# Patient Record
Sex: FEMALE | Race: WHITE | HISPANIC OR LATINO | Employment: FULL TIME | URBAN - NONMETROPOLITAN AREA
[De-identification: names, ages, dates, MRNs, and addresses within clinical notes are randomized per-mention and may not be internally consistent; named-entity substitution may affect disease eponyms.]

---

## 2017-03-21 ENCOUNTER — HOSPITAL ENCOUNTER (OUTPATIENT)
Dept: LAB | Facility: MEDICAL CENTER | Age: 57
End: 2017-03-21
Attending: INTERNAL MEDICINE
Payer: COMMERCIAL

## 2017-03-21 DIAGNOSIS — Z00.00 PREVENTATIVE HEALTH CARE: ICD-10-CM

## 2017-03-21 LAB
25(OH)D3 SERPL-MCNC: 48 NG/ML (ref 30–100)
ALBUMIN SERPL BCP-MCNC: 4.3 G/DL (ref 3.2–4.9)
ALBUMIN/GLOB SERPL: 1.6 G/DL
ALP SERPL-CCNC: 70 U/L (ref 30–99)
ALT SERPL-CCNC: 20 U/L (ref 2–50)
ANION GAP SERPL CALC-SCNC: 9 MMOL/L (ref 0–11.9)
AST SERPL-CCNC: 19 U/L (ref 12–45)
BASOPHILS # BLD AUTO: 0.05 K/UL (ref 0–0.12)
BASOPHILS NFR BLD AUTO: 0.8 % (ref 0–1.8)
BILIRUB SERPL-MCNC: 0.6 MG/DL (ref 0.1–1.5)
BUN SERPL-MCNC: 17 MG/DL (ref 8–22)
CALCIUM SERPL-MCNC: 10 MG/DL (ref 8.5–10.5)
CHLORIDE SERPL-SCNC: 106 MMOL/L (ref 96–112)
CHOLEST SERPL-MCNC: 158 MG/DL (ref 100–199)
CO2 SERPL-SCNC: 28 MMOL/L (ref 20–33)
CREAT SERPL-MCNC: 0.8 MG/DL (ref 0.5–1.4)
EOSINOPHIL # BLD: 0.23 K/UL (ref 0–0.51)
EOSINOPHIL NFR BLD AUTO: 3.7 % (ref 0–6.9)
ERYTHROCYTE [DISTWIDTH] IN BLOOD BY AUTOMATED COUNT: 39.9 FL (ref 35.9–50)
GLOBULIN SER CALC-MCNC: 2.7 G/DL (ref 1.9–3.5)
GLUCOSE SERPL-MCNC: 100 MG/DL (ref 65–99)
HCT VFR BLD AUTO: 41.7 % (ref 37–47)
HDLC SERPL-MCNC: 39 MG/DL
HGB BLD-MCNC: 14 G/DL (ref 12–16)
IMM GRANULOCYTES # BLD AUTO: 0.01 K/UL (ref 0–0.11)
IMM GRANULOCYTES NFR BLD AUTO: 0.2 % (ref 0–0.9)
LDLC SERPL CALC-MCNC: 96 MG/DL
LYMPHOCYTES # BLD: 1.9 K/UL (ref 1–4.8)
LYMPHOCYTES NFR BLD AUTO: 30.4 % (ref 22–41)
MCH RBC QN AUTO: 29.4 PG (ref 27–33)
MCHC RBC AUTO-ENTMCNC: 33.6 G/DL (ref 33.6–35)
MCV RBC AUTO: 87.4 FL (ref 81.4–97.8)
MONOCYTES # BLD: 0.29 K/UL (ref 0–0.85)
MONOCYTES NFR BLD AUTO: 4.6 % (ref 0–13.4)
NEUTROPHILS # BLD: 3.78 K/UL (ref 2–7.15)
NEUTROPHILS NFR BLD AUTO: 60.3 % (ref 44–72)
NRBC # BLD AUTO: 0 K/UL
NRBC BLD-RTO: 0 /100 WBC
PLATELET # BLD AUTO: 267 K/UL (ref 164–446)
PMV BLD AUTO: 10.1 FL (ref 9–12.9)
POTASSIUM SERPL-SCNC: 3.7 MMOL/L (ref 3.6–5.5)
PROT SERPL-MCNC: 7 G/DL (ref 6–8.2)
RBC # BLD AUTO: 4.77 M/UL (ref 4.2–5.4)
SODIUM SERPL-SCNC: 143 MMOL/L (ref 135–145)
TRIGL SERPL-MCNC: 115 MG/DL (ref 0–149)
WBC # BLD AUTO: 6.3 K/UL (ref 4.8–10.8)

## 2017-03-21 PROCEDURE — 36415 COLL VENOUS BLD VENIPUNCTURE: CPT

## 2017-03-21 PROCEDURE — 80061 LIPID PANEL: CPT

## 2017-03-21 PROCEDURE — 85025 COMPLETE CBC W/AUTO DIFF WBC: CPT

## 2017-03-21 PROCEDURE — 80053 COMPREHEN METABOLIC PANEL: CPT

## 2017-03-21 PROCEDURE — 82306 VITAMIN D 25 HYDROXY: CPT

## 2017-03-30 ENCOUNTER — OFFICE VISIT (OUTPATIENT)
Dept: MEDICAL GROUP | Facility: PHYSICIAN GROUP | Age: 57
End: 2017-03-30
Payer: COMMERCIAL

## 2017-03-30 VITALS
SYSTOLIC BLOOD PRESSURE: 116 MMHG | WEIGHT: 181 LBS | HEART RATE: 68 BPM | DIASTOLIC BLOOD PRESSURE: 84 MMHG | OXYGEN SATURATION: 95 % | HEIGHT: 62 IN | BODY MASS INDEX: 33.31 KG/M2 | RESPIRATION RATE: 16 BRPM | TEMPERATURE: 97.8 F

## 2017-03-30 DIAGNOSIS — E03.9 HYPOTHYROIDISM, UNSPECIFIED TYPE: ICD-10-CM

## 2017-03-30 DIAGNOSIS — I10 HTN, GOAL BELOW 130/80: Chronic | ICD-10-CM

## 2017-03-30 DIAGNOSIS — R73.01 FASTING HYPERGLYCEMIA: ICD-10-CM

## 2017-03-30 DIAGNOSIS — F41.9 ANXIETY: ICD-10-CM

## 2017-03-30 DIAGNOSIS — E55.9 VITAMIN D DEFICIENCY: ICD-10-CM

## 2017-03-30 DIAGNOSIS — Z86.69 HISTORY OF SCIATICA: ICD-10-CM

## 2017-03-30 DIAGNOSIS — E66.9 NON MORBID OBESITY, UNSPECIFIED OBESITY TYPE: ICD-10-CM

## 2017-03-30 DIAGNOSIS — E78.00 HYPERCHOLESTEROLEMIA: Chronic | ICD-10-CM

## 2017-03-30 PROCEDURE — 99214 OFFICE O/P EST MOD 30 MIN: CPT | Performed by: INTERNAL MEDICINE

## 2017-03-30 ASSESSMENT — PAIN SCALES - GENERAL: PAINLEVEL: 3=SLIGHT PAIN

## 2017-03-30 NOTE — PATIENT INSTRUCTIONS
Goal bp 135/85 or below.      Trial stop the hydrochlorothiazide and potassium.  Blood pressure monitoring at various times during the day.    Gradual ongoing stretching strengthening exercise

## 2017-03-30 NOTE — MR AVS SNAPSHOT
"        Carey Gutierrez   3/30/2017 11:00 AM   Office Visit   MRN: [id number]    Department:  [facility]   Dept Phone:  [phone]    Description:  Female : 1960   Provider:  Kierra MAGALLANES M.D.           Reason for Visit     Results labs       Allergies as of 3/30/2017     Allergen Noted Reactions    Erythromycin 2016   Nausea    Lisinopril 2017   Cough    Nkda [No Known Drug Allergy] 2010         You were diagnosed with     Non morbid obesity, unspecified obesity type   [1602467]       Anxiety   [096148]       HTN, goal below 130/80   [766926]       History of sciatica   [283753]       Hypercholesterolemia   [001370]       Vitamin D deficiency   [5928758]       Fasting hyperglycemia   [540622]       Hypothyroidism, unspecified type   [9423572]         Vital Signs     Blood Pressure Pulse Temperature Respirations Height Weight    116/84 mmHg 68 36.6 °C (97.8 °F) 16 1.575 m (5' 2\") 82.101 kg (181 lb)    Body Mass Index Oxygen Saturation Last Menstrual Period Smoking Status          33.10 kg/m2 95% 2010 Former Smoker        Basic Information     Date Of Birth Sex Race Ethnicity Preferred Language    1960 Female White Non- English      Your appointments     2017  1:00 PM   Adult Draw/Collection with [facility]   [facility] (--)    [address]  [place] [address]   [phone]            2017 10:40 AM   Established Patient with Kierra MAGALLANES M.D.   [facility] (--)    [address]  [place] [id number] [phone]           You will be receiving a confirmation call a few days before your appointment from our automated call confirmation system.              Problem List              ICD-10-CM Priority Class Noted - Resolved    MDD (major depressive disorder) F32.9   2013 - Present    Anxiety F41.9   2013 - Present    HTN, goal below 130/80 (Chronic) I10   2013 - Present   " Hypercholesterolemia (Chronic) E78.00   2/5/2013 - Present    SNORING DISORDER (Chronic)    2/20/2013 - Present    Obesity E66.9   9/30/2013 - Present    Eczema, dyshidrotic L30.1   11/12/2013 - Present    Vitamin D deficiency E55.9   12/31/2015 - Present    Fatigue R53.83   12/31/2015 - Present    History of sciatica Z86.69   3/30/2017 - Present      Health Maintenance        Date Due Completion Dates    IMM DTaP/Tdap/Td Vaccine (1 - Tdap) 12/22/1980 ---    IMM INFLUENZA (1) 9/1/2016 11/29/2011, 3/10/2010    MAMMOGRAM 3/22/2018 3/22/2017    COLONOSCOPY 12/28/2021 12/28/2011 (Done)    Override on 12/28/2011: Done            Current Immunizations     Influenza TIV (IM) 11/29/2011, 3/10/2010 12:27 PM    Pneumococcal polysaccharide vaccine (PPSV-23) 3/10/2010 12:15 PM      Below and/or attached are the medications your provider expects you to take. Review all of your home medications and newly ordered medications with your provider and/or pharmacist. Follow medication instructions as directed by your provider and/or pharmacist. Please keep your medication list with you and share with your provider. Update the information when medications are discontinued, doses are changed, or new medications (including over-the-counter products) are added; and carry medication information at all times in the event of emergency situations     Allergies:  ERYTHROMYCIN - Nausea     LISINOPRIL - Cough     NKDA - (reactions not documented)               Medications  Valid as of: March 30, 2017 - 11:39 AM    Generic Name Brand Name Tablet Size Instructions for use    Ergocalciferol (Cap) DRISDOL 88719 UNIT Take 1 capsule 3x week.        FLUoxetine HCl (Cap) PROZAC 20 MG Take 1 Cap by mouth every day.        HydroCHLOROthiazide (Tab) HYDRODIURIL 25 MG Take 1 Tab by mouth every day.        Potassium Chloride Macey CR (Tab CR) Kdur 20 MEQ Take 1 Tab by mouth every day.        Simvastatin (Tab) ZOCOR 10 MG Take 1 Tab by mouth every evening.          Terbinafine HCl (Tab) LAMISIL 250 MG Take 250 mg by mouth every day.        .                 Medicines prescribed today were sent to:     [address] - [facility] [address] [place] [address]    Phone: [phone] Fax: [phone]    Open 24 Hours?: No      Medication refill instructions:       If your prescription bottle indicates you have medication refills left, it is not necessary to call your provider’s office. Please contact your pharmacy and they will refill your medication.    If your prescription bottle indicates you do not have any refills left, you may request refills at any time through one of the following ways: The online [employer] system (except Urgent Care), by calling your provider’s office, or by asking your pharmacy to contact your provider’s office with a refill request. Medication refills are processed only during regular business hours and may not be available until the next business day. Your provider may request additional information or to have a follow-up visit with you prior to refilling your medication.   *Please Note: Medication refills are assigned a new Rx number when refilled electronically. Your pharmacy may indicate that no refills were authorized even though a new prescription for the same medication is available at the pharmacy. Please request the medicine by name with the pharmacy before contacting your provider for a refill.        Your To Do List     Future Labs/Procedures Complete By Expires    BASIC METABOLIC PANEL  As directed 3/30/2018    TSH  As directed 3/30/2018      Instructions      Goal bp 135/85 or below.      Trial stop the hydrochlorothiazide and potassium.  Blood pressure monitoring at various times during the day.    Gradual ongoing stretching strengthening exercise          Boosted Boardshart Access Code: Activation code not generated  Current [employer]ent Status: Active

## 2017-03-30 NOTE — ASSESSMENT & PLAN NOTE
Patient states her anxiety is good and she is continuing on the Prozac. Patient reports she has been on meds for many years and multiple family members with anxiety.

## 2017-03-30 NOTE — ASSESSMENT & PLAN NOTE
Patient not following at home except rarely and no complaints of elevation, no chest pain or palpitations, she is continuing the HCTZ.  Had cough in the past with the lisinopril

## 2017-03-30 NOTE — ASSESSMENT & PLAN NOTE
Patient with bilateral lateral knee pain, reports she has history of sciatica and that has improved, now with pain only lateral knees bilateraly.  No weakness. She is doing knee and back exercises.

## 2017-03-30 NOTE — ASSESSMENT & PLAN NOTE
Patient is working on diet, has , doing yoga.  Working on some weight watchers, weight is down about 10#.  states she craves sugar.

## 2017-04-03 NOTE — PROGRESS NOTES
Chief Complaint   Patient presents with   • Results     labs        HISTORY OF PRESENT ILLNESS: Patient is a 56 y.o. female established patient who presents today to discuss the medical issues below.    Obesity  Patient is working on diet, has , doing yoga.  Working on some weight watchers, weight is down about 10#.  states she craves sugar.      Anxiety  Patient states her anxiety is controlled and she is continuing on the Prozac. Patient reports she has been on meds for many years and multiple family members with anxiety. Sleeping well, food difficult to control.     HTN, goal below 130/80  Patient not following at home except rarely and no complaints of elevation, no chest pain or palpitations, she is continuing the HCTZ.  Had cough in the past with the lisinopril    History of sciatica  Patient with bilateral lateral knee pain, reports she has history of sciatica and that has improved, now with pain only lateral knees bilateraly.  No weakness. She is doing knee and back exercises.     Hypercholesterolemia  continueson statin, working on exercise, had labs.      Vitamin D deficiency  Patient on vit d supplement 50,000 IU weekly, had labs.        Patient Active Problem List    Diagnosis Date Noted   • History of sciatica 03/30/2017   • Vitamin D deficiency 12/31/2015   • Fatigue 12/31/2015   • Eczema, dyshidrotic 11/12/2013   • Obesity 09/30/2013   • SNORING DISORDER 02/20/2013   • HTN, goal below 130/80 02/05/2013   • Hypercholesterolemia 02/05/2013   • MDD (major depressive disorder) 01/29/2013   • Anxiety 01/29/2013       Allergies:Erythromycin; Lisinopril; and Nkda    Current Outpatient Prescriptions   Medication Sig Dispense Refill   • fluoxetine (PROZAC) 20 MG Cap Take 1 Cap by mouth every day. 90 Cap 3   • simvastatin (ZOCOR) 10 MG Tab Take 1 Tab by mouth every evening. 90 Tab 3   • potassium chloride SA (KLOR-CON M20) 20 MEQ Tab CR Take 1 Tab by mouth every day. 90 Tab 3   •  "hydrochlorothiazide (HYDRODIURIL) 25 MG Tab Take 1 Tab by mouth every day. 90 Tab 3   • ergocalciferol (DRISDOL) 60539 UNIT capsule Take 1 capsule 3x week. 12 Cap 4   • terbinafine (LAMISIL) 250 MG Tab Take 250 mg by mouth every day.       No current facility-administered medications for this visit.         Past Medical History   Diagnosis Date   • Other specified symptom associated with female genital organs    • Snoring    • depression      taking prozac,wellbutrin   • Hyperlipidemia    • Depression    • HTN, goal below 130/80 2/5/2013   • Hypercholesterolemia 2/5/2013   • SNORING DISORDER 2/20/2013   • History of sciatica 3/30/2017       Social History   Substance Use Topics   • Smoking status: Former Smoker     Quit date: 01/01/1989   • Smokeless tobacco: Never Used      Comment: 1 ppd 7 yrs,quit 1989   • Alcohol Use: 7.0 oz/week     14 Standard drinks or equivalent per week      Comment: rare       No family status information on file.     Family History   Problem Relation Age of Onset   • Hypertension Mother    • Stroke Mother 74   • Hypertension Father    • Diabetes Brother      DMII       ROS:    Respiratory: Negative for cough, sputum production, shortness of breath or wheezing.    Cardiovascular: Negative for chest pain, palpitations, orthopnea, dyspnea with exertion or edema.   Gastrointestinal: Negative for GI upset, nausea, vomiting, abdominal pain, constipation or diarrhea.   Genitourinary: Negative for dysuria, urgency, hesitancy or frequency.       Exam:  Blood pressure 116/84, pulse 68, temperature 36.6 °C (97.8 °F), resp. rate 16, height 1.575 m (5' 2\"), weight 82.101 kg (181 lb), last menstrual period 01/16/2010, SpO2 95 %.  General:  Well nourished, well developed female in NAD.  HENT: Normocephalic, bilateral TMs are intact, nasal and oral mucosa with no lesions,   Neck: Supple without bruit. Thyroid is not enlarged.  Pulmonary: Clear to ausculation and percussion.  Normal effort. No rales, " rhonchi, or wheezing.  Cardiovascular: Regular rate and rhythm without murmur.   Abdomen: Normal bowel sounds soft and nontender no palpable liver spleen bladder mass.  Extremities: No LE edema noted.  Neuro: Grossly nonfocal.  Psych: Alert and oriented to person, place, and time. Appropriate mood and conversation.        This dictation was created using voice recognition software. I have made reasonable attempts to correct errors, however, errors of grammar and content may exist.          Assessment/Plan:    1. Non morbid obesity, unspecified obesity type  Discussed diet, exercise, weight loss, behavioral modification, portion size management.    2. Anxiety  Discussed component of anxiety regarding behavior and diet.      3. HTN, goal below 130/80  Reviewed diet exercise implications of weight on bp, trial discontinue the HCTZ and potssium. Monitor bp.    4. History of sciatica  Reviewed weight loss, back stretching.      5. Hypercholesterolemia  Continued statin management, weight management.     6. Vitamin D deficiency  On supplement, reviewed     7. Fasting hyperglycemia  Implications of fasting hyperglycemia, diet exercise weight loss, check postprandial glu  - BASIC METABOLIC PANEL; Future    8. Hypothyroidism, unspecified type  Clinically euthyroid, lab monitoring indicating euthyroid, had medication trial historically, no change to energy, weight or fatigue, monitor off medications, discussed with patient. Resolve the diagnosis.   - TSH; Future    Patient was seen for 30 minutes face to face of which more than 50% of the time was spent in counseling and coordination of care regarding the above problems.

## 2017-07-14 ENCOUNTER — HOSPITAL ENCOUNTER (OUTPATIENT)
Dept: LAB | Facility: MEDICAL CENTER | Age: 57
End: 2017-07-14
Attending: INTERNAL MEDICINE
Payer: COMMERCIAL

## 2017-07-14 DIAGNOSIS — R73.01 FASTING HYPERGLYCEMIA: ICD-10-CM

## 2017-07-14 DIAGNOSIS — E03.9 HYPOTHYROIDISM, UNSPECIFIED TYPE: ICD-10-CM

## 2017-07-14 LAB
ANION GAP SERPL CALC-SCNC: 7 MMOL/L (ref 0–11.9)
BUN SERPL-MCNC: 16 MG/DL (ref 8–22)
CALCIUM SERPL-MCNC: 10.3 MG/DL (ref 8.5–10.5)
CHLORIDE SERPL-SCNC: 104 MMOL/L (ref 96–112)
CO2 SERPL-SCNC: 27 MMOL/L (ref 20–33)
CREAT SERPL-MCNC: 0.77 MG/DL (ref 0.5–1.4)
GFR SERPL CREATININE-BSD FRML MDRD: >60 ML/MIN/1.73 M 2
GLUCOSE SERPL-MCNC: 86 MG/DL (ref 65–99)
POTASSIUM SERPL-SCNC: 4 MMOL/L (ref 3.6–5.5)
SODIUM SERPL-SCNC: 138 MMOL/L (ref 135–145)
TSH SERPL DL<=0.005 MIU/L-ACNC: 1.76 UIU/ML (ref 0.3–3.7)

## 2017-07-14 PROCEDURE — 80048 BASIC METABOLIC PNL TOTAL CA: CPT

## 2017-07-14 PROCEDURE — 84443 ASSAY THYROID STIM HORMONE: CPT

## 2017-07-14 PROCEDURE — 36415 COLL VENOUS BLD VENIPUNCTURE: CPT

## 2017-07-20 ENCOUNTER — OFFICE VISIT (OUTPATIENT)
Dept: MEDICAL GROUP | Facility: PHYSICIAN GROUP | Age: 57
End: 2017-07-20
Payer: COMMERCIAL

## 2017-07-20 VITALS
RESPIRATION RATE: 16 BRPM | BODY MASS INDEX: 34.04 KG/M2 | WEIGHT: 185 LBS | DIASTOLIC BLOOD PRESSURE: 90 MMHG | OXYGEN SATURATION: 96 % | TEMPERATURE: 97.1 F | SYSTOLIC BLOOD PRESSURE: 130 MMHG | HEART RATE: 56 BPM | HEIGHT: 62 IN

## 2017-07-20 DIAGNOSIS — Z82.3 FAMILY HISTORY OF STROKE: ICD-10-CM

## 2017-07-20 DIAGNOSIS — F33.9 EPISODE OF RECURRENT MAJOR DEPRESSIVE DISORDER, UNSPECIFIED DEPRESSION EPISODE SEVERITY (HCC): ICD-10-CM

## 2017-07-20 DIAGNOSIS — E55.9 VITAMIN D DEFICIENCY: ICD-10-CM

## 2017-07-20 DIAGNOSIS — I10 HTN, GOAL BELOW 130/80: Chronic | ICD-10-CM

## 2017-07-20 DIAGNOSIS — Z23 NEED FOR TDAP VACCINATION: ICD-10-CM

## 2017-07-20 DIAGNOSIS — E66.9 NON MORBID OBESITY, UNSPECIFIED OBESITY TYPE: ICD-10-CM

## 2017-07-20 PROCEDURE — 90471 IMMUNIZATION ADMIN: CPT | Performed by: INTERNAL MEDICINE

## 2017-07-20 PROCEDURE — 90715 TDAP VACCINE 7 YRS/> IM: CPT | Performed by: INTERNAL MEDICINE

## 2017-07-20 PROCEDURE — 99214 OFFICE O/P EST MOD 30 MIN: CPT | Mod: 25 | Performed by: INTERNAL MEDICINE

## 2017-07-20 RX ORDER — TERBINAFINE HYDROCHLORIDE 250 MG/1
250 TABLET ORAL DAILY
COMMUNITY
End: 2017-08-25

## 2017-07-20 ASSESSMENT — PAIN SCALES - GENERAL: PAINLEVEL: NO PAIN

## 2017-07-20 NOTE — ASSESSMENT & PLAN NOTE
Patient has been on the prozac for greater than 20 years, she has done very well with medications and has maintained stable for years.  Recommend ongoing lifetime rx

## 2017-07-20 NOTE — MR AVS SNAPSHOT
"        Carey Gutierrez   2017 10:40 AM   Office Visit   MRN: [id number]    Department:  [facility]   Dept Phone:  [phone]    Description:  Female : 1960   Provider:  Kierra MAGALLANES M.D.           Reason for Visit     Results labs     Immunizations TDAP      Allergies as of 2017     Allergen Noted Reactions    Erythromycin 2016   Nausea    Lisinopril 2017   Cough    Nkda [No Known Drug Allergy] 2010         You were diagnosed with     HTN, goal below 130/80   [949398]       Non morbid obesity, unspecified obesity type   [8442570]       Family history of stroke   [284623]       Episode of recurrent major depressive disorder, unspecified depression episode severity (CMS-HCC)   [9351878]       Vitamin D deficiency   [4735636]       Need for Tdap vaccination   [135839]         Vital Signs     Blood Pressure Pulse Temperature Respirations Height Weight    130/90 mmHg 56 36.2 °C (97.1 °F) 16 1.575 m (5' 2.01\") 83.915 kg (185 lb)    Body Mass Index Oxygen Saturation Last Menstrual Period Smoking Status          33.83 kg/m2 96% 2010 Former Smoker        Basic Information     Date Of Birth Sex Race Ethnicity Preferred Language    1960 Female White Non- English      Your appointments     2018  7:00 AM   Adult Draw/Collection with [facility] (--)    [address] [address]   [phone]            2018 11:00 AM   Established Patient with Kierra MAGALLANES M.D.   [facility] [place] (--)    [address] [address]   [phone]           You will be receiving a confirmation call a few days before your appointment from our automated call confirmation system.              Problem List              ICD-10-CM Priority Class Noted - Resolved    MDD (major depressive disorder) (CMS-HCC) F32.9   2013 - Present    Anxiety F41.9   2013 - Present    HTN, goal below " 130/80 (Chronic) I10   2/5/2013 - Present    Hypercholesterolemia (Chronic) E78.00   2/5/2013 - Present    SNORING DISORDER (Chronic)    2/20/2013 - Present    Obesity E66.9   9/30/2013 - Present    Eczema, dyshidrotic L30.1   11/12/2013 - Present    Vitamin D deficiency E55.9   12/31/2015 - Present    Fatigue R53.83   12/31/2015 - Present    History of sciatica Z86.69   3/30/2017 - Present    Need for Tdap vaccination Z23   7/20/2017 - Present      Health Maintenance        Date Due Completion Dates    IMM DTaP/Tdap/Td Vaccine (1 - Tdap) 12/22/1980 ---    IMM INFLUENZA (1) 9/1/2017 11/29/2011, 3/10/2010    MAMMOGRAM 3/22/2018 3/22/2017    COLONOSCOPY 12/28/2021 12/28/2011 (Done)    Override on 12/28/2011: Done            Current Immunizations     Influenza TIV (IM) 11/29/2011, 3/10/2010 12:27 PM    Pneumococcal polysaccharide vaccine (PPSV-23) 3/10/2010 12:15 PM    Tdap Vaccine  Incomplete      Below and/or attached are the medications your provider expects you to take. Review all of your home medications and newly ordered medications with your provider and/or pharmacist. Follow medication instructions as directed by your provider and/or pharmacist. Please keep your medication list with you and share with your provider. Update the information when medications are discontinued, doses are changed, or new medications (including over-the-counter products) are added; and carry medication information at all times in the event of emergency situations     Allergies:  ERYTHROMYCIN - Nausea     LISINOPRIL - Cough     NKDA - (reactions not documented)               Medications  Valid as of: July 20, 2017 - 11:29 AM    Generic Name Brand Name Tablet Size Instructions for use    Ergocalciferol (Cap) DRISDOL 20812 UNIT Take 1 capsule 3x week.        FLUoxetine HCl (Cap) PROZAC 20 MG Take 1 Cap by mouth every day.        HydroCHLOROthiazide (Tab) HYDRODIURIL 25 MG Take 1 Tab by mouth every day.        Potassium Chloride Macey CR  (Tab CR) Kdur 20 MEQ Take 1 Tab by mouth every day.        Simvastatin (Tab) ZOCOR 10 MG Take 1 Tab by mouth every evening.        Terbinafine HCl (Tab) LAMISIL 250 MG Take 250 mg by mouth every day.        Terbinafine HCl (Tab) LAMISIL 250 MG Take 250 mg by mouth every day.        .                 Medicines prescribed today were sent to:     [address] [place] [address]    Phone: [phone] Fax: [phone]    Open 24 Hours?: No      Medication refill instructions:       If your prescription bottle indicates you have medication refills left, it is not necessary to call your provider’s office. Please contact your pharmacy and they will refill your medication.    If your prescription bottle indicates you do not have any refills left, you may request refills at any time through one of the following ways: The online [employer] system (except Urgent Care), by calling your provider’s office, or by asking your pharmacy to contact your provider’s office with a refill request. Medication refills are processed only during regular business hours and may not be available until the next business day. Your provider may request additional information or to have a follow-up visit with you prior to refilling your medication.   *Please Note: Medication refills are assigned a new Rx number when refilled electronically. Your pharmacy may indicate that no refills were authorized even though a new prescription for the same medication is available at the pharmacy. Please request the medicine by name with the pharmacy before contacting your provider for a refill.        Your To Do List     Future Labs/Procedures Complete By Expires    CBC WITH DIFFERENTIAL  As directed 7/20/2018    COMP METABOLIC PANEL  As directed 7/20/2018    LIPID PROFILE  As directed 7/20/2018    VITAMIN D,25 HYDROXY  As directed 7/20/2018      Referral     A referral request has been sent to our patient  care coordination department. Please allow 3-5 business days for us to process this request and contact you either by phone or mail. If you do not hear from us by the 5th business day, please call us at [phone].           Boardvote Access Code: Activation code not generated  Current Boardvote Status: Active

## 2017-07-20 NOTE — PROGRESS NOTES
Chief Complaint   Patient presents with   • Results     labs    • Immunizations     TDAP       HISTORY OF PRESENT ILLNESS: Patient is a 56 y.o. female established patient who presents today to discuss the medical issues below.    HTN, goal below 130/80  Patient has been following at home, readings always below systolic <140, 52% between 80-89.  She never has record of greater than 140/90.    Obesity  Patient is doing exercise and reports that her  has measured and she is exchanging for muscle.      MDD (major depressive disorder)  Patient has been on the prozac for greater than 20 years, she has done very well with medications and has maintained stable for years.  Recommend ongoing lifetime rx      Patient Active Problem List    Diagnosis Date Noted   • History of sciatica 03/30/2017   • Vitamin D deficiency 12/31/2015   • Fatigue 12/31/2015   • Eczema, dyshidrotic 11/12/2013   • Obesity 09/30/2013   • SNORING DISORDER 02/20/2013   • HTN, goal below 130/80 02/05/2013   • Hypercholesterolemia 02/05/2013   • MDD (major depressive disorder) (CMS-[facility]) 01/29/2013   • Anxiety 01/29/2013       Allergies:Erythromycin; Lisinopril; and Nkda    Current Outpatient Prescriptions   Medication Sig Dispense Refill   • terbinafine (LAMISIL) 250 MG Tab Take 250 mg by mouth every day.     • tetanus-dipth-acell pertussis (ADACEL) 5-2-15.5 LF-MCG/0.5 Suspension 0.5 mL by Intramuscular route Once PRN for up to 1 dose. 0.5 mL 0   • terbinafine (LAMISIL) 250 MG Tab Take 250 mg by mouth every day.     • fluoxetine (PROZAC) 20 MG Cap Take 1 Cap by mouth every day. 90 Cap 3   • simvastatin (ZOCOR) 10 MG Tab Take 1 Tab by mouth every evening. 90 Tab 3   • potassium chloride SA (KLOR-CON M20) 20 MEQ Tab CR Take 1 Tab by mouth every day. 90 Tab 3   • hydrochlorothiazide (HYDRODIURIL) 25 MG Tab Take 1 Tab by mouth every day. 90 Tab 3   • ergocalciferol (DRISDOL) 60596 UNIT capsule Take 1 capsule 3x week. 12 Cap 4     No current  "facility-administered medications for this visit.         Past Medical History   Diagnosis Date   • Other specified symptom associated with female genital organs    • Snoring    • depression      taking prozac,wellbutrin   • Hyperlipidemia    • Depression    • HTN, goal below 130/80 2/5/2013   • Hypercholesterolemia 2/5/2013   • SNORING DISORDER 2/20/2013   • History of sciatica 3/30/2017       Social History   Substance Use Topics   • Smoking status: Former Smoker     Quit date: 01/01/1989   • Smokeless tobacco: Never Used      Comment: 1 ppd 7 yrs,quit 1989   • Alcohol Use: 7.0 oz/week     14 Standard drinks or equivalent per week      Comment: rare       No family status information on file.     Family History   Problem Relation Age of Onset   • Hypertension Mother    • Stroke Mother 74   • Hypertension Father    • Diabetes Brother      DMII       ROS:    Respiratory: Negative for cough, sputum production, shortness of breath or wheezing.    Cardiovascular: Negative for chest pain, palpitations, orthopnea, dyspnea with exertion or edema.   Gastrointestinal: Negative for GI upset, nausea, vomiting, abdominal pain, constipation or diarrhea.   Genitourinary: Negative for dysuria, urgency, hesitancy or frequency.       Exam:    Blood pressure 130/90, pulse 56, temperature 36.2 °C (97.1 °F), resp. rate 16, height 1.575 m (5' 2.01\"), weight 83.915 kg (185 lb), last menstrual period 01/16/2010, SpO2 96 %.  General:  Well nourished, well developed female in NAD.  Pulmonary: Clear to ausculation and percussion.  Normal effort. No rales, rhonchi, or wheezing.  Cardiovascular: Regular rate and rhythm without murmur.   Abdomen: Normal bowel sounds soft and nontender no palpable liver spleen bladder mass.  Extremities: No LE edema noted.  Neuro: Grossly nonfocal.  Psych: Alert and oriented to person, place, and time. Appropriate mood and conversation.    LABS: Results reviewed and discussed with the patient, questions " answered.      This dictation was created using voice recognition software. I have made reasonable attempts to correct errors, however, errors of grammar and content may exist.          Assessment/Plan:    1. HTN, goal below 130/80  Excellent control continue ongoing periodic monitoring no medications reviewed diet  - COMP METABOLIC PANEL; Future  - CBC WITH DIFFERENTIAL; Future    2. Non morbid obesity, unspecified obesity type  Patient actually doing very well with training diet weight. Reviewed diet exercise weight loss, ongoing support and monitoring.  - Patient identified as having weight management issue.  Appropriate orders and counseling given.    3. Family history of stroke  Patient with well-controlled blood pressure lipids well controlled discussed [facility] recommendations. Offered referral to lipid clinic considering her strong family history high educational level high compliance level and option for any additional maximization of preventative medicine.  - REFERRAL TO LIPID CLINIC  - LIPID PROFILE; Future    4. Episode of recurrent major depressive disorder, unspecified depression episode severity (CMS-HCC)  Patient consistent with endogenous depression well managed and stable on medications for years. Recommend continuing meds indefinitely.    5. Vitamin D deficiency  On supplementation ongoing left monitoring periodically.  - VITAMIN D,25 HYDROXY; Future      6. Tetanus vaccination  T dap Administered today.     Patient was seen for  25 minutes face to face of which more than 50% of the time was spent in counseling and coordination of care regarding the above problems.

## 2017-07-20 NOTE — ASSESSMENT & PLAN NOTE
Patient has been following at home, readings always below systolic <140, 52% between 80-89.  She never has record of greater than 140/90.

## 2017-08-11 RX ORDER — SIMVASTATIN 10 MG
TABLET ORAL
Qty: 90 TAB | Refills: 1 | Status: SHIPPED | OUTPATIENT
Start: 2017-08-11 | End: 2017-11-22

## 2017-08-11 RX ORDER — ERGOCALCIFEROL 1.25 MG/1
CAPSULE ORAL
Qty: 12 CAP | Refills: 1 | Status: SHIPPED | OUTPATIENT
Start: 2017-08-11 | End: 2018-03-04 | Stop reason: SDUPTHER

## 2017-08-11 RX ORDER — FLUOXETINE HYDROCHLORIDE 20 MG/1
CAPSULE ORAL
Qty: 90 CAP | Refills: 1 | Status: SHIPPED | OUTPATIENT
Start: 2017-08-11 | End: 2018-04-06 | Stop reason: SDUPTHER

## 2017-08-11 NOTE — TELEPHONE ENCOUNTER
Was the patient seen in the last year in this department? Yes     Does patient have an active prescription for medications requested? No     Received Request Via: Pharmacy      Pt met protocol?: Yes pt last ov 7/17   Lab Results   Component Value Date/Time    HDL 39* 03/21/2017 07:51 AM        25-HYDROXY   VITAMIN D 25   Date Value Ref Range Status   03/21/2017 48 30 - 100 ng/mL Final     Comment:     Adult Ranges:   <20 ng/mL - Deficiency  20-29 ng/mL - Insufficiency   ng/mL - Sufficiency  The [employer] Centaur Vitamin D Assay is standardized to the  [facility] reference measurement procedures, a  reference method for the Vitamin D Standardization Program  (VDSP).  The VDSP aligns patient results among 25 (OH)  Vitamin D methods.

## 2017-08-11 NOTE — TELEPHONE ENCOUNTER
Pt has had OV within the 12 month protocol and lipid panel is current. 6 month supply sent to pharmacy. Last seen 7/17.  Lab Results   Component Value Date/Time    CHOLESTEROL, 03/21/2017 07:51 AM    LDL 96 03/21/2017 07:51 AM    HDL 39* 03/21/2017 07:51 AM    TRIGLYCERIDES 115 03/21/2017 07:51 AM       Lab Results   Component Value Date/Time    SODIUM 138 07/14/2017 11:22 AM    POTASSIUM 4.0 07/14/2017 11:22 AM    CHLORIDE 104 07/14/2017 11:22 AM    CO2 27 07/14/2017 11:22 AM    GLUCOSE 86 07/14/2017 11:22 AM    BUN 16 07/14/2017 11:22 AM    CREATININE 0.77 07/14/2017 11:22 AM     Lab Results   Component Value Date/Time    ALKALINE PHOSPHATASE 70 03/21/2017 07:51 AM    AST(SGOT) 19 03/21/2017 07:51 AM    ALT(SGPT) 20 03/21/2017 07:51 AM    TOTAL BILIRUBIN 0.6 03/21/2017 07:51 AM

## 2017-08-25 ENCOUNTER — OFFICE VISIT (OUTPATIENT)
Dept: URGENT CARE | Facility: PHYSICIAN GROUP | Age: 57
End: 2017-08-25
Payer: COMMERCIAL

## 2017-08-25 VITALS
DIASTOLIC BLOOD PRESSURE: 70 MMHG | BODY MASS INDEX: 34.23 KG/M2 | HEIGHT: 62 IN | SYSTOLIC BLOOD PRESSURE: 130 MMHG | WEIGHT: 186 LBS | TEMPERATURE: 97.8 F | RESPIRATION RATE: 16 BRPM | OXYGEN SATURATION: 94 % | HEART RATE: 63 BPM

## 2017-08-25 DIAGNOSIS — H92.01 ACUTE OTALGIA, RIGHT: ICD-10-CM

## 2017-08-25 PROCEDURE — 99203 OFFICE O/P NEW LOW 30 MIN: CPT | Performed by: NURSE PRACTITIONER

## 2017-08-25 RX ORDER — FLUTICASONE PROPIONATE 50 MCG
1 SPRAY, SUSPENSION (ML) NASAL 2 TIMES DAILY
Qty: 16 G | Refills: 0 | Status: SHIPPED | OUTPATIENT
Start: 2017-08-25 | End: 2017-11-22

## 2017-08-25 NOTE — MR AVS SNAPSHOT
"        Carey Gutierrez   2017 3:45 PM   Office Visit   MRN: [id number]    Department:  [facility]   Dept Phone:  [phone]    Description:  Female : 1960   Provider:  NEFTALI Santana           Reason for Visit     Otalgia x1day R ear      Allergies as of 2017     Allergen Noted Reactions    Erythromycin 2016   Nausea    Lisinopril 2017   Cough      You were diagnosed with     Acute otalgia, right   [[id number]]         Vital Signs     Blood Pressure Pulse Temperature Respirations Height Weight    130/70 mmHg 63 36.6 °C (97.8 °F) 16 1.575 m (5' 2.01\") 84.369 kg (186 lb)    Body Mass Index Oxygen Saturation Last Menstrual Period Breastfeeding? Smoking Status       34.01 kg/m2 94% 2010 No Former Smoker       Basic Information     Date Of Birth Sex Race Ethnicity Preferred Language    1960 Female White Non- English      Your appointments     2017  3:20 PM   Initial Visit with Michael J Bloch, M.D., [facility] EXAM 1   [facility]  (--)    [address] [facility] [address]   [phone]            2018  7:00 AM   Adult Draw/Collection with [facility]   [facility] (--)    [address] [facility] [address]   [phone]            2018 11:00 AM   Established Patient with Kierra MAGALLANES M.D.   [facility] (--)    [address] [facility] [id number] [phone]           You will be receiving a confirmation call a few days before your appointment from our automated call confirmation system.              Problem List              ICD-10-CM Priority Class Noted - Resolved    MDD (major depressive disorder) (CMS-HCC) F32.9   2013 - Present    Anxiety F41.9   2013 - Present    HTN, goal below 130/80 (Chronic) I10   2013 - Present    Hypercholesterolemia (Chronic) E78.00   2013 - Present    SNORING DISORDER (Chronic)    " 2/20/2013 - Present    Obesity E66.9   9/30/2013 - Present    Eczema, dyshidrotic L30.1   11/12/2013 - Present    Vitamin D deficiency E55.9   12/31/2015 - Present    Fatigue R53.83   12/31/2015 - Present    History of sciatica Z86.69   3/30/2017 - Present    Need for Tdap vaccination Z23   7/20/2017 - Present      Health Maintenance        Date Due Completion Dates    IMM INFLUENZA (1) 9/1/2017 11/29/2011, 3/10/2010    MAMMOGRAM 3/22/2018 3/22/2017    COLONOSCOPY 12/28/2021 12/28/2011 (Done)    Override on 12/28/2011: Done    IMM DTaP/Tdap/Td Vaccine (2 - Td) 7/20/2027 7/20/2017            Current Immunizations     Influenza TIV (IM) 11/29/2011, 3/10/2010 12:27 PM    Pneumococcal polysaccharide vaccine (PPSV-23) 3/10/2010 12:15 PM    Tdap Vaccine 7/20/2017      Below and/or attached are the medications your provider expects you to take. Review all of your home medications and newly ordered medications with your provider and/or pharmacist. Follow medication instructions as directed by your provider and/or pharmacist. Please keep your medication list with you and share with your provider. Update the information when medications are discontinued, doses are changed, or new medications (including over-the-counter products) are added; and carry medication information at all times in the event of emergency situations     Allergies:  ERYTHROMYCIN - Nausea     LISINOPRIL - Cough               Medications  Valid as of: August 25, 2017 -  5:16 PM    Generic Name Brand Name Tablet Size Instructions for use    Ergocalciferol (Cap) DRISDOL 49645 units TAKE ONE CAPSULE BY MOUTH THREE TIMES PER WEEK        FLUoxetine HCl (Cap) PROZAC 20 MG TAKE ONE CAPSULE BY MOUTH ONCE DAILY        Fluticasone Propionate (Suspension) FLONASE 50 MCG/ACT Spray 1 Spray in nose 2 times a day.        Simvastatin (Tab) ZOCOR 10 MG TAKE ONE TABLET BY MOUTH IN THE EVENING        Terbinafine HCl (Tab) LAMISIL 250 MG Take 250 mg by mouth every day.         .                 Medicines prescribed today were sent to:     [facility] [address] [place] [address], [place] - [address] [facility]    [address] [facility] [place] [address]    Phone: [phone] Fax: [phone]    Open 24 Hours?: No      Medication refill instructions:       If your prescription bottle indicates you have medication refills left, it is not necessary to call your provider’s office. Please contact your pharmacy and they will refill your medication.    If your prescription bottle indicates you do not have any refills left, you may request refills at any time through one of the following ways: The online [place] [facility] system (except Urgent Care), by calling your provider’s office, or by asking your pharmacy to contact your provider’s office with a refill request. Medication refills are processed only during regular business hours and may not be available until the next business day. Your provider may request additional information or to have a follow-up visit with you prior to refilling your medication.   *Please Note: Medication refills are assigned a new Rx number when refilled electronically. Your pharmacy may indicate that no refills were authorized even though a new prescription for the same medication is available at the pharmacy. Please request the medicine by name with the pharmacy before contacting your provider for a refill.        Referral     A referral request has been sent to our patient care coordination department. Please allow 3-5 business days for us to process this request and contact you either by phone or mail. If you do not hear from us by the 5th business day, please call us at [phone].           [place] [facility] Access Code: Activation code not generated  Current [place] [facility] Status: Active

## 2017-08-26 NOTE — PROGRESS NOTES
Chief Complaint   Patient presents with   • Otalgia     x1day R ear       HISTORY OF PRESENT ILLNESS: Patient is a 56 y.o. female who presents today due to complaints of right ear pain. States she was chewing an almond yesterday when she developed sudden, sharp pain, to her right ear. The pain only lasted a short while. She has since had two less severe reoccurrences of the pain today. Denies changes in hearing, fever, chills, nasal congestion, cough, drainage, injury or trauma. Does admit to history of TMJ and typically wears a mouth guard which she has not been using lately. She has not taken anything for symptom relief. Denies history of similar symptoms.     Patient Active Problem List    Diagnosis Date Noted   • Need for Tdap vaccination 07/20/2017   • History of sciatica 03/30/2017   • Vitamin D deficiency 12/31/2015   • Fatigue 12/31/2015   • Eczema, dyshidrotic 11/12/2013   • Obesity 09/30/2013   • SNORING DISORDER 02/20/2013   • HTN, goal below 130/80 02/05/2013   • Hypercholesterolemia 02/05/2013   • MDD (major depressive disorder) (CMS-[facility]) 01/29/2013   • Anxiety 01/29/2013       Allergies:Erythromycin and Lisinopril    Current Outpatient Prescriptions Ordered in [facility]   Medication Sig Dispense Refill   • fluticasone (FLONASE) 50 MCG/ACT nasal spray Spray 1 Spray in nose 2 times a day. 16 g 0   • vitamin D, Ergocalciferol, (DRISDOL) 87130 UNITS Cap capsule TAKE ONE CAPSULE BY MOUTH THREE TIMES PER WEEK 12 Cap 1   • fluoxetine (PROZAC) 20 MG Cap TAKE ONE CAPSULE BY MOUTH ONCE DAILY 90 Cap 1   • simvastatin (ZOCOR) 10 MG Tab TAKE ONE TABLET BY MOUTH IN THE EVENING 90 Tab 1   • terbinafine (LAMISIL) 250 MG Tab Take 250 mg by mouth every day.       No current [facility]-ordered facility-administered medications on file.       Past Medical History   Diagnosis Date   • Other specified symptom associated with female genital organs    • Snoring    • depression      taking prozac,wellbutrin   • Hyperlipidemia    •  "Depression    • HTN, goal below 130/80 2/5/2013   • Hypercholesterolemia 2/5/2013   • SNORING DISORDER 2/20/2013   • History of sciatica 3/30/2017       Social History   Substance Use Topics   • Smoking status: Former Smoker     Quit date: 01/01/1989   • Smokeless tobacco: Never Used      Comment: 1 ppd 7 yrs,quit 1989   • Alcohol Use: 7.0 oz/week     14 Standard drinks or equivalent per week      Comment: rare       No family status information on file.     Family History   Problem Relation Age of Onset   • Hypertension Mother    • Stroke Mother 74   • Hypertension Father    • Diabetes Brother      DMII       ROS:  Review of Systems   Constitutional: Negative for fever, chills, weight loss, malaise, and fatigue.   HENT: Positive for ear pain. Negative for nosebleeds, congestion, sore throat and neck pain.    Eyes: Negative for vision changes.   Neuro: Negative for headache, sensory changes, weakness, seizure, LOC.   Cardiovascular: Negative for chest pain, palpitations, orthopnea and leg swelling.   Respiratory: Negative for cough, sputum production, shortness of breath and wheezing.   Gastrointestinal: Negative for abdominal pain, nausea, vomiting or diarrhea.   Musculoskeletal: Negative for falls, neck pain, back pain, joint pain, myalgias.   Skin: Negative for rash, diaphoresis.     Exam:  Blood pressure 130/70, pulse 63, temperature 36.6 °C (97.8 °F), resp. rate 16, height 1.575 m (5' 2.01\"), weight 84.369 kg (186 lb), last menstrual period 01/16/2010, SpO2 94 %, not currently breastfeeding.  General: well-nourished, well-developed female in NAD  Head: normocephalic, atraumatic, no bony tenderness  Eyes: PERRLA, no conjunctival injection, acuity grossly intact, lids normal.  Ears: normal shape and symmetry, no tenderness, no discharge. External canals are without any significant edema or erythema. Tympanic membranes are without any inflammation, no effusion. Gross auditory acuity is intact.  Nose: symmetrical " without tenderness, no discharge. No mastoid tenderness.   Mouth/Throat: reasonable hygiene, no erythema, exudates or tonsillar enlargement. No pain with mastication.   Neck: no masses, range of motion within normal limits, no tracheal deviation. No obvious thyroid enlargement.   Lymph: no cervical adenopathy. No supraclavicular adenopathy.   Neuro: alert and oriented. Cranial nerves 1-12 grossly intact. No sensory deficit.   Cardiovascular: regular rate and rhythm. No edema.  Pulmonary: no distress. Chest is symmetrical with respiration, no wheezes, crackles, or rhonchi.   Musculoskeletal: no clubbing, appropriate muscle tone, gait is stable.  Skin: warm, dry, intact, no clubbing, no cyanosis, no rashes.   Psych: appropriate mood, affect, judgement.         Assessment/Plan:  1. Acute otalgia, right  fluticasone (FLONASE) 50 MCG/ACT nasal spray    REFERRAL TO ENT       Exam is negative. Consider EUD and/or referred pain from TMJ. Flonase as directed for potential inflammation. Ref to ENT for any worsening or no improvement in symptoms.   Supportive care, differential diagnoses, and indications for immediate follow-up discussed with patient.   Pathogenesis of diagnosis discussed including typical length and natural progression.   Instructed to return to clinic or nearest emergency department for any change in condition, further concerns, or worsening of symptoms.  Patient states understanding of the plan of care and discharge instructions.  Instructed to make an appointment, for follow up, with their primary care provider.        Please note that this dictation was created using voice recognition software. I have made every reasonable attempt to correct obvious errors, but I expect that there are errors of grammar and possibly content that I did not discover before finalizing the note.      ISAIAS Alexis.

## 2017-09-26 ENCOUNTER — OFFICE VISIT (OUTPATIENT)
Dept: MEDICAL GROUP | Facility: PHYSICIAN GROUP | Age: 57
End: 2017-09-26
Payer: COMMERCIAL

## 2017-09-26 VITALS
WEIGHT: 187 LBS | HEART RATE: 60 BPM | HEIGHT: 62 IN | RESPIRATION RATE: 14 BRPM | BODY MASS INDEX: 34.41 KG/M2 | SYSTOLIC BLOOD PRESSURE: 136 MMHG | OXYGEN SATURATION: 95 % | DIASTOLIC BLOOD PRESSURE: 80 MMHG | TEMPERATURE: 99.2 F

## 2017-09-26 DIAGNOSIS — S80.12XD CONTUSION OF LEFT LOWER LEG, SUBSEQUENT ENCOUNTER: ICD-10-CM

## 2017-09-26 DIAGNOSIS — M79.605 LEFT LEG PAIN: ICD-10-CM

## 2017-09-26 PROBLEM — S80.12XA CONTUSION OF LEFT LOWER LEG: Status: ACTIVE | Noted: 2017-09-26

## 2017-09-26 PROCEDURE — 99213 OFFICE O/P EST LOW 20 MIN: CPT | Performed by: NURSE PRACTITIONER

## 2017-09-26 NOTE — PROGRESS NOTES
"[facility]  Primary Care Office Visit - Problem-Oriented        History:     Carey Gutierrez is a 56 y.o. female who is here today to discuss Leg Pain (L Shin bump x couple months)      Contusion of left lower leg  Patient with left anterior shin pain ×4 months. Patient reports that about 4 months ago she made impact with the sharp edge of an ottoman. She developed a significant bruise and has had a small \"bump \"on the front of her shin since. This causes no pain, she has had no erythema, drainage, pain with palpation. It has not increased in size since the initial incident.        Past Medical History:   Diagnosis Date   • History of sciatica 3/30/2017   • SNORING DISORDER 2/20/2013   • HTN, goal below 130/80 2/5/2013   • Hypercholesterolemia 2/5/2013   • depression     taking prozac,wellbutrin   • Depression    • Hyperlipidemia    • Other specified symptom associated with female genital organs    • Snoring      Past Surgical History:   Procedure Laterality Date   • HYSTERECTOMY ROBOTIC  3/9/2010    Performed by DONTRELL DURAN at SURGERY [facility] ORS   • CYSTOSCOPY  3/9/2010    Performed by DONTRELL DURAN at SURGERY [facility] ORS   • PRIMARY C SECTION  2/28/05   • APPENDECTOMY  1976   • TONSILLECTOMY  1975     Social History     Social History   • Marital status:      Spouse name: N/A   • Number of children: N/A   • Years of education: N/A     Occupational History   • Not on file.     Social History Main Topics   • Smoking status: Former Smoker     Quit date: 1/1/1989   • Smokeless tobacco: Never Used      Comment: 1 ppd 7 yrs,quit 1989   • Alcohol use 7.0 oz/week     14 Standard drinks or equivalent per week      Comment: rare   • Drug use: No   • Sexual activity: Not Currently     Other Topics Concern   • Not on file     Social History Narrative   • No narrative on file     History   Smoking Status   • Former Smoker   • Quit date: 1/1/1989   Smokeless Tobacco   • Never Used "     Comment: 1 ppd 7 yrs,quit 1989     Family History   Problem Relation Age of Onset   • Hypertension Mother    • Stroke Mother 74   • Hypertension Father    • Diabetes Brother      DMII     Allergies   Allergen Reactions   • Erythromycin Nausea   • Lisinopril Cough       Problem List:     Patient Active Problem List    Diagnosis Date Noted   • Left leg pain 09/26/2017   • Need for Tdap vaccination 07/20/2017   • History of sciatica 03/30/2017   • Vitamin D deficiency 12/31/2015   • Fatigue 12/31/2015   • Eczema, dyshidrotic 11/12/2013   • Obesity 09/30/2013   • SNORING DISORDER 02/20/2013   • HTN, goal below 130/80 02/05/2013   • Hypercholesterolemia 02/05/2013   • MDD (major depressive disorder) ([facility]) 01/29/2013   • Anxiety 01/29/2013         Medications:     Current Outpatient Prescriptions:   •  vitamin D, Ergocalciferol, (DRISDOL) 93493 UNITS Cap capsule, TAKE ONE CAPSULE BY MOUTH THREE TIMES PER WEEK, Disp: 12 Cap, Rfl: 1  •  fluoxetine (PROZAC) 20 MG Cap, TAKE ONE CAPSULE BY MOUTH ONCE DAILY, Disp: 90 Cap, Rfl: 1  •  simvastatin (ZOCOR) 10 MG Tab, TAKE ONE TABLET BY MOUTH IN THE EVENING, Disp: 90 Tab, Rfl: 1  •  fluticasone (FLONASE) 50 MCG/ACT nasal spray, Spray 1 Spray in nose 2 times a day., Disp: 16 g, Rfl: 0  •  terbinafine (LAMISIL) 250 MG Tab, Take 250 mg by mouth every day., Disp: , Rfl:       Review of Systems:     Pertinent positives as per HPI, all other systems reviewed and WNL     Physical Assessment:     VS: /80   Pulse 60   Temp 37.3 °C (99.2 °F)   Resp 14   Wt 84.8 kg (187 lb)   LMP 01/16/2010   SpO2 95%   BMI 34.19 kg/m²     General: Well-developed, well-nourished, female     Head: PERRL, EOMI. Normocephalic. No facial asymmetry noted.  Cardiovasc:No JVD.  RRR, no MRG. No thrills or bruits. Pulses 2+ and symmetric at all distal extremities.  Pulmonary: Lungs clear bilaterally.  Normal respiratory effort. No wheeze or crackles.   Extremities: No edema, no TTP bilateral  "calves. Pedal pulses intact. No joint effusions. LEs warm and well-perfused. LLE anterior shin with small soft nodularity, roughly the size of a dime, no erythema, no pain with palpation.   Neuro: Alert and oriented  Skin:No rashes noted. Skin warm, dry, intact    Psych: Dressed appropriately for the weather, pleasant and conversant.  Affect, mood & judgment appropriate.    Assessment/Plan:   Carey was seen today for leg pain.    Diagnoses and all orders for this visit:    Contusion of left lower leg, subsequent encounter  - Reassurance, discussed deep contusion versus \"bone bruise,\" continue to monitor for concerning changes, follow-up as needed.    Patient is agreeable to the above plan and voiced understanding. All questions answered.     Please note that this dictation was created using voice recognition software. I have made every reasonable attempt to correct obvious errors, but I expect that there are errors of grammar and possibly content that I did not discover before finalizing the note.      PAYAM Camara  9/26/2017, 8:31 AM  "

## 2017-09-26 NOTE — ASSESSMENT & PLAN NOTE
"Patient with left anterior shin pain ×4 months. Patient reports that about 4 months ago she made impact with the sharp edge of an ottoman. She developed a significant bruise and has had a small \"bump \"on the front of her shin since. This causes no pain, she has had no erythema, drainage, pain with palpation. It has not increased in size since the initial incident.  "

## 2017-11-20 ENCOUNTER — APPOINTMENT (OUTPATIENT)
Dept: VASCULAR LAB | Facility: MEDICAL CENTER | Age: 57
End: 2017-11-20
Payer: COMMERCIAL

## 2017-11-22 ENCOUNTER — OFFICE VISIT (OUTPATIENT)
Dept: VASCULAR LAB | Facility: MEDICAL CENTER | Age: 57
End: 2017-11-22
Attending: INTERNAL MEDICINE
Payer: COMMERCIAL

## 2017-11-22 VITALS
WEIGHT: 189.6 LBS | SYSTOLIC BLOOD PRESSURE: 144 MMHG | BODY MASS INDEX: 34.89 KG/M2 | HEIGHT: 62 IN | DIASTOLIC BLOOD PRESSURE: 81 MMHG | HEART RATE: 62 BPM

## 2017-11-22 DIAGNOSIS — I10 ESSENTIAL HYPERTENSION: Chronic | ICD-10-CM

## 2017-11-22 DIAGNOSIS — G47.33 OSA (OBSTRUCTIVE SLEEP APNEA): ICD-10-CM

## 2017-11-22 DIAGNOSIS — E78.5 DYSLIPIDEMIA: ICD-10-CM

## 2017-11-22 PROCEDURE — 99212 OFFICE O/P EST SF 10 MIN: CPT

## 2017-11-22 PROCEDURE — 99204 OFFICE O/P NEW MOD 45 MIN: CPT | Performed by: INTERNAL MEDICINE

## 2017-11-22 RX ORDER — VALSARTAN 160 MG/1
160 TABLET ORAL DAILY
Qty: 30 TAB | Refills: 11 | Status: SHIPPED | OUTPATIENT
Start: 2017-11-22 | End: 2018-07-23

## 2017-11-22 RX ORDER — ASPIRIN 81 MG/1
81 TABLET, CHEWABLE ORAL DAILY
Qty: 100 TAB | Refills: 10
Start: 2017-11-22

## 2017-11-22 RX ORDER — ATORVASTATIN CALCIUM 10 MG/1
10 TABLET, FILM COATED ORAL DAILY
Qty: 30 TAB | Refills: 11 | Status: SHIPPED | OUTPATIENT
Start: 2017-11-22 | End: 2018-12-29 | Stop reason: SDUPTHER

## 2017-11-22 ASSESSMENT — ENCOUNTER SYMPTOMS
PALPITATIONS: 1
SHORTNESS OF BREATH: 0
CLAUDICATION: 0
LOSS OF CONSCIOUSNESS: 0
WEIGHT LOSS: 0
DOUBLE VISION: 0
BLURRED VISION: 0
COUGH: 0
NERVOUS/ANXIOUS: 0
DEPRESSION: 0
FALLS: 0
BRUISES/BLEEDS EASILY: 0
SENSORY CHANGE: 0
MYALGIAS: 0
SPEECH CHANGE: 0
BLOOD IN STOOL: 0
HEADACHES: 1

## 2017-11-22 NOTE — PROGRESS NOTES
INITIAL VASCULAR VISIT  Subjective:   Carey Gutierrez is a 56 y.o. female who presents today 11/22/2017 for   Chief Complaint   Patient presents with   • Amb Vascular Reason For Visit     HPI:  Referred by PCP for eval and management of dyslipidemia and htn  Patient with fairly long history of hypertension.She had a cough on an ACE inhibitor. She was switched to a thiazide type diuretic. She did tolerate that well although had a bit of hypo-K lamia and took a potassium pill. She was able to lose about 10 pounds and she weaned off the medication. Over the past few months she has gained back about 10 pounds and her blood pressure is gone back up again. She says that her blood pressures at home are generally in the high 130s to low 140s over high 80s to low 90s. She had no symptoms of high blood pressure. No interfering medications. She is not currently on blood pressure medications. She is currently on some statin 10 mg a day. She does know she took one statin in the past and also tolerated it. She has no myalgias. No known history of ASCVD. Does have a family history of stroke. She's been diagnosed with obstructive sleep apnea, but does not use EPAP. No cardiac vascular complaints.    Past Medical History:   Diagnosis Date   • depression     taking prozac,wellbutrin   • History of sciatica 3/30/2017   • Hyperlipidemia    • Hypertension    • SURI (obstructive sleep apnea)    • Other specified symptom associated with female genital organs      Past Surgical History:   Procedure Laterality Date   • HYSTERECTOMY ROBOTIC  3/9/2010    Performed by DONTRELL DURAN at SURGERY [facility] ORS   • CYSTOSCOPY  3/9/2010    Performed by DONTRELL DURAN at SURGERY [facility] ORS   • PRIMARY C SECTION  2/28/05   • APPENDECTOMY  1976   • TONSILLECTOMY  1975     Family History   Problem Relation Age of Onset   • Hypertension Mother    • Stroke Mother 74   • Hypertension Father    • Stroke Father 65     tia   •  Diabetes Brother      DMII   • Heart Disease Paternal Grandmother 55     fatal - no details available     History   Smoking Status   • Former Smoker   • Quit date: 1/1/1989   Smokeless Tobacco   • Never Used     Comment: 1 ppd 7 yrs,quit 1989     Social History   Substance Use Topics   • Smoking status: Former Smoker     Quit date: 1/1/1989   • Smokeless tobacco: Never Used      Comment: 1 ppd 7 yrs,quit 1989   • Alcohol use 7.0 oz/week     14 Standard drinks or equivalent per week      Comment: rare     Outpatient Encounter Prescriptions as of 11/22/2017   Medication Sig Dispense Refill   • atorvastatin (LIPITOR) 10 MG Tab Take 1 Tab by mouth every day. 30 Tab 11   • valsartan (DIOVAN) 160 MG Tab Take 1 Tab by mouth every day. 30 Tab 11   • aspirin (ASA) 81 MG Chew Tab chewable tablet Take 1 Tab by mouth every day. 100 Tab 10   • vitamin D, Ergocalciferol, (DRISDOL) 92151 UNITS Cap capsule TAKE ONE CAPSULE BY MOUTH THREE TIMES PER WEEK 12 Cap 1   • fluoxetine (PROZAC) 20 MG Cap TAKE ONE CAPSULE BY MOUTH ONCE DAILY 90 Cap 1   • [DISCONTINUED] fluticasone (FLONASE) 50 MCG/ACT nasal spray Spray 1 Spray in nose 2 times a day. 16 g 0   • [DISCONTINUED] simvastatin (ZOCOR) 10 MG Tab TAKE ONE TABLET BY MOUTH IN THE EVENING 90 Tab 1   • [DISCONTINUED] terbinafine (LAMISIL) 250 MG Tab Take 250 mg by mouth every day.       No facility-administered encounter medications on file as of 11/22/2017.      Allergies   Allergen Reactions   • Erythromycin Nausea   • Lisinopril Cough     DIET AND EXERCISE:  Weight Change: has had weight regain  Diet: less adherent to low carb diet  Exercise: , yoga - moderate exercise  Illicit drug use - patient has a distant history of relatively heavy cocaine use    Review of Systems   Constitutional: Negative for malaise/fatigue and weight loss.   HENT: Negative for hearing loss and tinnitus.    Eyes: Negative for blurred vision and double vision.   Respiratory: Negative for cough  "and shortness of breath.    Cardiovascular: Positive for palpitations. Negative for chest pain, claudication and leg swelling.   Gastrointestinal: Negative for blood in stool.   Genitourinary: Negative for hematuria.   Musculoskeletal: Negative for falls and myalgias.   Skin: Negative for rash.   Neurological: Positive for headaches. Negative for sensory change, speech change and loss of consciousness.   Endo/Heme/Allergies: Does not bruise/bleed easily.   Psychiatric/Behavioral: Negative for depression. The patient is not nervous/anxious.       Objective:     Vitals:    11/22/17 1309 11/22/17 1311   BP: 139/77 144/81   Pulse: 66 62   Weight: 86 kg (189 lb 9.6 oz)    Height: 1.575 m (5' 2\")       Body mass index is 34.68 kg/m².  Physical Exam   Constitutional: She is oriented to person, place, and time. She appears well-developed and well-nourished. No distress.   HENT:   Head: Normocephalic and atraumatic.   Eyes: Conjunctivae and EOM are normal. Pupils are equal, round, and reactive to light. No scleral icterus.   Neck: Normal range of motion. Neck supple. No JVD present. No thyromegaly present.   Cardiovascular: Normal rate, regular rhythm, normal heart sounds and intact distal pulses.  Exam reveals no gallop and no friction rub.    No murmur heard.  Pulmonary/Chest: Effort normal and breath sounds normal. No respiratory distress. She has no wheezes. She has no rales. She exhibits no tenderness.   Abdominal: Soft. Bowel sounds are normal. She exhibits no distension and no mass. There is no tenderness. There is no rebound and no guarding.   Musculoskeletal: Normal range of motion. She exhibits no edema or tenderness.   Neurological: She is alert and oriented to person, place, and time. She has normal reflexes. No cranial nerve deficit. Coordination normal.   Skin: Skin is warm and dry. No rash noted. She is not diaphoretic. No erythema. No pallor.   Psychiatric: She has a normal mood and affect.   Vitals " reviewed.    Lab Results   Component Value Date    CHOLSTRLTOT 158 03/21/2017    LDL 96 03/21/2017    HDL 39 (A) 03/21/2017    TRIGLYCERIDE 115 03/21/2017           Lab Results   Component Value Date    SODIUM 138 07/14/2017    POTASSIUM 4.0 07/14/2017    CHLORIDE 104 07/14/2017    CO2 27 07/14/2017    GLUCOSE 86 07/14/2017    BUN 16 07/14/2017    CREATININE 0.77 07/14/2017    IFAFRICA >60 07/14/2017    IFNOTAFR >60 07/14/2017        Lab Results   Component Value Date    WBC 6.3 03/21/2017    RBC 4.77 03/21/2017    HEMOGLOBIN 14.0 03/21/2017    HEMATOCRIT 41.7 03/21/2017    MCV 87.4 03/21/2017    MCH 29.4 03/21/2017    MCHC 33.6 03/21/2017    MPV 10.1 03/21/2017      Medical Decision Making:  Today's Assessment / Status / Plan:     1. Essential hypertension  valsartan (DIOVAN) 160 MG Tab    COMP METABOLIC PANEL    MICROALBUMIN CREAT RATIO URINE   2. Dyslipidemia  atorvastatin (LIPITOR) 10 MG Tab    COMP METABOLIC PANEL    CRP HIGH SENSITIVE (CARDIAC)    TSH    LIPID PROFILE    aspirin (ASA) 81 MG Chew Tab chewable tablet   3. SURI (obstructive sleep apnea)       Patient Type: Primary Prevention    Etiology of Established CVD if Present:None although does have a strong family history of TIA and stroke    Lipid Management: Qualifies for Statin Therapy Based on 2013 ACC/AHA Guidelines: no  Calculated 10-Year Risk of ASCVD: 2.7%  Currently on Statin: Yes  Patient does not necessarily qualify for statin therapy based on ACC AHA guidelines  However these guidelines do not take into account her family history, her lifetime risk, or her illicit drug use in the distant past and as such may underestimate her risk.  She does seem to tolerate statins well  She is not on an evidence based dose  I outlined 2 different strategies for her, as I suggested we could either put her on a moderate intensity statin or further risk stratify her. She agrees to change to a moderate intensity statin  Goal LDL less than 100 and goal non-HDL  less than 130 per national lipid Association guidelines  - Change simvastatin to atorvastatin 10 mrem daily  - Continue lifestyle modification as per below  - Recheck fasting lipid panel prior to next visit    Blood Pressure Management:  Based on 2017 ACC AHA guidelines, patient does have hypertension and given her office blood pressure greater than 140 systolic and home blood pressure greater than 135 systolic she qualifies for pharmacologic therapy  Blood pressure goal based on ACC AHA guideline - less than 130/80 both at home and in the office  - Start valsartan 160 mg daily  - Intensify lifestyle modification as per below  - Continue home blood pressure monitoring  - Consider addition of thiazide type diuretic and blood pressure not optimally controlled, especially at home    Glycemic Status: Normal  - Recheck fasting glucose    Anti-Platelet/Anti-Coagulant Tx:   - We'll start low-dose aspirin based on her vascular risk factors and family history of stroke    Smoking: Continue complete avoidance    Physical Activity: Continue excellent exercise regimen    Weight Management and Nutrition: Dietary plan was discussed with patient at this visit including going back to low carbohydrate diet with goal of losing 10 pounds    Other:     1.  Shortness of sleep apnea. Patient not currently using C Pap. I have encouraged her to go back and speech are seen physicians about getting a mask that she can tolerate. Will defer further management PCP and sleep team     Instructed to follow-up with PCP for remainder of adult medical needs: yes  We will partner with other providers in the management of established vascular disease and cardiometabolic risk factors.    Studies to Be Obtained: None  Labs to Be Obtained: Fasting lipid panel, CRP, TSH, urine for albumin    Follow up in: 6 weeks    Michael J Bloch, M.D.     CC: Kierra Barger

## 2018-01-17 ENCOUNTER — APPOINTMENT (OUTPATIENT)
Dept: VASCULAR LAB | Facility: MEDICAL CENTER | Age: 58
End: 2018-01-17
Payer: COMMERCIAL

## 2018-01-31 ENCOUNTER — HOSPITAL ENCOUNTER (OUTPATIENT)
Dept: LAB | Facility: MEDICAL CENTER | Age: 58
End: 2018-01-31
Attending: INTERNAL MEDICINE
Payer: COMMERCIAL

## 2018-01-31 DIAGNOSIS — I10 ESSENTIAL HYPERTENSION: Chronic | ICD-10-CM

## 2018-01-31 DIAGNOSIS — E78.5 DYSLIPIDEMIA: ICD-10-CM

## 2018-01-31 LAB
ALBUMIN SERPL BCP-MCNC: 4.4 G/DL (ref 3.2–4.9)
ALBUMIN/GLOB SERPL: 2 G/DL
ALP SERPL-CCNC: 74 U/L (ref 30–99)
ALT SERPL-CCNC: 19 U/L (ref 2–50)
ANION GAP SERPL CALC-SCNC: 7 MMOL/L (ref 0–11.9)
AST SERPL-CCNC: 17 U/L (ref 12–45)
BILIRUB SERPL-MCNC: 0.4 MG/DL (ref 0.1–1.5)
BUN SERPL-MCNC: 14 MG/DL (ref 8–22)
CALCIUM SERPL-MCNC: 9.2 MG/DL (ref 8.5–10.5)
CHLORIDE SERPL-SCNC: 106 MMOL/L (ref 96–112)
CHOLEST SERPL-MCNC: 134 MG/DL (ref 100–199)
CO2 SERPL-SCNC: 28 MMOL/L (ref 20–33)
CREAT SERPL-MCNC: 0.72 MG/DL (ref 0.5–1.4)
CREAT UR-MCNC: 200.9 MG/DL
CRP SERPL HS-MCNC: 3.8 MG/L (ref 0–7.5)
GLOBULIN SER CALC-MCNC: 2.2 G/DL (ref 1.9–3.5)
GLUCOSE SERPL-MCNC: 96 MG/DL (ref 65–99)
HDLC SERPL-MCNC: 45 MG/DL
LDLC SERPL CALC-MCNC: 72 MG/DL
MICROALBUMIN UR-MCNC: 3.2 MG/DL
MICROALBUMIN/CREAT UR: 16 MG/G (ref 0–30)
POTASSIUM SERPL-SCNC: 3.8 MMOL/L (ref 3.6–5.5)
PROT SERPL-MCNC: 6.6 G/DL (ref 6–8.2)
SODIUM SERPL-SCNC: 141 MMOL/L (ref 135–145)
TRIGL SERPL-MCNC: 87 MG/DL (ref 0–149)
TSH SERPL DL<=0.005 MIU/L-ACNC: 1.4 UIU/ML (ref 0.38–5.33)

## 2018-01-31 PROCEDURE — 86141 C-REACTIVE PROTEIN HS: CPT

## 2018-01-31 PROCEDURE — 36415 COLL VENOUS BLD VENIPUNCTURE: CPT

## 2018-01-31 PROCEDURE — 84443 ASSAY THYROID STIM HORMONE: CPT

## 2018-01-31 PROCEDURE — 82043 UR ALBUMIN QUANTITATIVE: CPT

## 2018-01-31 PROCEDURE — 80053 COMPREHEN METABOLIC PANEL: CPT

## 2018-01-31 PROCEDURE — 82570 ASSAY OF URINE CREATININE: CPT

## 2018-01-31 PROCEDURE — 80061 LIPID PANEL: CPT

## 2018-02-02 ENCOUNTER — APPOINTMENT (OUTPATIENT)
Dept: VASCULAR LAB | Facility: MEDICAL CENTER | Age: 58
End: 2018-02-02
Payer: COMMERCIAL

## 2018-02-21 ENCOUNTER — OFFICE VISIT (OUTPATIENT)
Dept: VASCULAR LAB | Facility: MEDICAL CENTER | Age: 58
End: 2018-02-21
Attending: INTERNAL MEDICINE
Payer: COMMERCIAL

## 2018-02-21 VITALS
WEIGHT: 191.2 LBS | HEART RATE: 61 BPM | SYSTOLIC BLOOD PRESSURE: 137 MMHG | BODY MASS INDEX: 35.19 KG/M2 | DIASTOLIC BLOOD PRESSURE: 86 MMHG | HEIGHT: 62 IN

## 2018-02-21 DIAGNOSIS — E78.00 HYPERCHOLESTEROLEMIA: Chronic | ICD-10-CM

## 2018-02-21 DIAGNOSIS — I10 ESSENTIAL HYPERTENSION: Chronic | ICD-10-CM

## 2018-02-21 DIAGNOSIS — E55.9 VITAMIN D DEFICIENCY: ICD-10-CM

## 2018-02-21 PROCEDURE — 99213 OFFICE O/P EST LOW 20 MIN: CPT | Performed by: NURSE PRACTITIONER

## 2018-02-21 PROCEDURE — 99212 OFFICE O/P EST SF 10 MIN: CPT | Performed by: NURSE PRACTITIONER

## 2018-02-21 RX ORDER — HYDROCHLOROTHIAZIDE 25 MG/1
12.5 TABLET ORAL DAILY
Qty: 30 TAB | Refills: 3 | Status: SHIPPED | OUTPATIENT
Start: 2018-02-21 | End: 2018-04-27

## 2018-02-21 ASSESSMENT — ENCOUNTER SYMPTOMS
BLOOD IN STOOL: 0
MYALGIAS: 0
DEPRESSION: 0
SHORTNESS OF BREATH: 0
WEIGHT LOSS: 0
BLURRED VISION: 0
BRUISES/BLEEDS EASILY: 1
FALLS: 0
CLAUDICATION: 0
DOUBLE VISION: 0
PALPITATIONS: 1

## 2018-02-21 NOTE — PROGRESS NOTES
FOLLOW UP VASCULAR VISIT  Subjective:   Carey Gutierrez is a 57 y.o. female who presents today 02/21/2018 for   Chief Complaint   Patient presents with   • Follow-Up     HPI:  Pt here today for follow up and management of dyslipidemia and htn  Has had some palpitations, which she relates to anxiety  Has been under stress lately  Home BP running 140's/80's at home  On Atorvastatin, denies leg pain or cramping  Tolerating Valsartan without dizziness  Denies chest pain, SOB, or leg swelling  On ASA no bleeding issues, bruising more easily recently  No change in weight since last visit  Past thiazide diuretic caused excessive urination           History   Smoking Status   • Former Smoker   • Quit date: 1/1/1989   Smokeless Tobacco   • Never Used     Comment: 1 ppd 7 yrs,quit 1989     Social History   Substance Use Topics   • Smoking status: Former Smoker     Quit date: 1/1/1989   • Smokeless tobacco: Never Used      Comment: 1 ppd 7 yrs,quit 1989   • Alcohol use 7.0 oz/week     14 Standard drinks or equivalent per week      Comment: rare         DIET AND EXERCISE:  Weight Change: has had weight regain over several months  Diet: less adherent to low carb diet  Exercise: , yoga - moderate exercise  Illicit drug use - patient has a distant history of relatively heavy cocaine use    Review of Systems   Constitutional: Negative for malaise/fatigue and weight loss.   Eyes: Negative for blurred vision and double vision.   Respiratory: Negative for shortness of breath.    Cardiovascular: Positive for palpitations. Negative for chest pain, claudication and leg swelling.   Gastrointestinal: Negative for blood in stool.   Genitourinary: Negative for hematuria.   Musculoskeletal: Negative for falls and myalgias.   Endo/Heme/Allergies: Bruises/bleeds easily.   Psychiatric/Behavioral: Negative for depression.      Objective:     Vitals:    02/21/18 1324 02/21/18 1336   BP: 142/88 137/86   Pulse: 63 61  "  Weight: 86.7 kg (191 lb 3.2 oz)    Height: 1.575 m (5' 2\")       Body mass index is 34.97 kg/m².  Physical Exam   Constitutional: She is oriented to person, place, and time. She appears well-developed.   Cardiovascular: Normal rate and normal heart sounds.  Exam reveals no gallop and no friction rub.    No murmur heard.  Pulmonary/Chest: Effort normal and breath sounds normal. No respiratory distress. She has no wheezes. She has no rales.   Musculoskeletal: She exhibits no edema.   Neurological: She is alert and oriented to person, place, and time.   Skin: Skin is warm and dry.   Psychiatric: She has a normal mood and affect.   Vitals reviewed.    Lab Results   Component Value Date    CHOLSTRLTOT 134 01/31/2018    LDL 72 01/31/2018    HDL 45 01/31/2018    TRIGLYCERIDE 87 01/31/2018           Lab Results   Component Value Date    SODIUM 141 01/31/2018    POTASSIUM 3.8 01/31/2018    CHLORIDE 106 01/31/2018    CO2 28 01/31/2018    GLUCOSE 96 01/31/2018    BUN 14 01/31/2018    CREATININE 0.72 01/31/2018    IFAFRICA >60 01/31/2018    IFNOTAFR >60 01/31/2018        Lab Results   Component Value Date    WBC 6.3 03/21/2017    RBC 4.77 03/21/2017    HEMOGLOBIN 14.0 03/21/2017    HEMATOCRIT 41.7 03/21/2017    MCV 87.4 03/21/2017    MCH 29.4 03/21/2017    MCHC 33.6 03/21/2017    MPV 10.1 03/21/2017      Medical Decision Making:  Today's Assessment / Status / Plan:     1. Essential hypertension  hydroCHLOROthiazide (HYDRODIURIL) 25 MG Tab    COMP METABOLIC PANEL   2. Hypercholesterolemia     3. Vitamin D deficiency  VITAMIN D 25-HYDROXY     Patient Type: Primary Prevention    Etiology of Established CVD if Present:None although does have a strong family history of TIA and stroke    Lipid Management: Qualifies for Statin Therapy Based on 2013 ACC/AHA Guidelines: no  Calculated 10-Year Risk of ASCVD: 2.7%  Currently on Statin: Yes  Patient does not necessarily qualify for statin therapy based on ACC AHA guidelines  However " these guidelines do not take into account her family history, her lifetime risk, or her illicit drug use in the distant past and as such may underestimate her risk.  She does seem to tolerate statins well  Goal LDL less than 100 and goal non-HDL less than 130 per national lipid Association guidelines  - Continue atorvastatin 10 mg daily  - Continue lifestyle modification as per below  - Recheck fasting lipid panel in 3 to 6 months    Blood Pressure Management:  Based on 2017 ACC AHA guidelines, patient does have hypertension and given her office blood pressure greater than 140 systolic and home blood pressure 137-140/80's she qualifies for pharmacologic therapy  Blood pressure goal based on ACC AHA guideline - less than 130/80 both at home and in the office  - Continue valsartan 160 mg daily  - Start HCTZ 25 mg take 1/2 tab or 12.5 mg dose in am. Last time she had problems with excessive urination on 25 mg dose. If dose increase required may need K+ supplement  - Intensify lifestyle modification as per below  - Continue home blood pressure monitoring  - CMP in 4 weeks to evaluate electrolytes  - Call pt with results on sendy for 4 weeks.      Glycemic Status: Normal  - Recheck fasting glucose    Anti-Platelet/Anti-Coagulant Tx:   -Continue ASA low dose based on her vascular risk factors and family history of stroke    Smoking: Continue complete avoidance    Physical Activity: Continue excellent exercise regimen    Weight Management and Nutrition: Dietary plan was discussed with patient at this visit including going back to low carbohydrate diet with goal of losing 10 pounds    Other:     1.  Obstructive sleep apnea. Encouraged pt to follow up with physician for recommendations about tolerance of appliance fit.  Will defer further management to PCP and sleep team     Instructed to follow-up with PCP for remainder of adult medical needs: yes  We will partner with other providers in the management of established vascular  disease and cardiometabolic risk factors.    Studies to Be Obtained: None  Labs to Be Obtained: CMP, vit D    Follow up in: 2 months    BARBARA Soto     CC: Kierra Barger

## 2018-03-05 RX ORDER — ERGOCALCIFEROL 1.25 MG/1
CAPSULE ORAL
Qty: 12 CAP | Refills: 1 | Status: SHIPPED | OUTPATIENT
Start: 2018-03-05 | End: 2018-10-05 | Stop reason: SDUPTHER

## 2018-03-05 NOTE — TELEPHONE ENCOUNTER
Was the patient seen in the last year in this department? No     Does patient have an active prescription for medications requested? No     Received Request Via: Pharmacy     Pt last OV 7/20/17, next OV scheduled 7/20/18.

## 2018-03-22 ENCOUNTER — HOSPITAL ENCOUNTER (OUTPATIENT)
Dept: LAB | Facility: MEDICAL CENTER | Age: 58
End: 2018-03-22
Attending: NURSE PRACTITIONER
Payer: COMMERCIAL

## 2018-03-22 DIAGNOSIS — E55.9 VITAMIN D DEFICIENCY: ICD-10-CM

## 2018-03-22 DIAGNOSIS — I10 ESSENTIAL HYPERTENSION: Chronic | ICD-10-CM

## 2018-03-22 LAB
25(OH)D3 SERPL-MCNC: 75 NG/ML (ref 30–100)
ALBUMIN SERPL BCP-MCNC: 4.3 G/DL (ref 3.2–4.9)
ALBUMIN/GLOB SERPL: 1.5 G/DL
ALP SERPL-CCNC: 79 U/L (ref 30–99)
ALT SERPL-CCNC: 28 U/L (ref 2–50)
ANION GAP SERPL CALC-SCNC: 13 MMOL/L (ref 0–11.9)
AST SERPL-CCNC: 20 U/L (ref 12–45)
BILIRUB SERPL-MCNC: 0.4 MG/DL (ref 0.1–1.5)
BUN SERPL-MCNC: 24 MG/DL (ref 8–22)
CALCIUM SERPL-MCNC: 9.8 MG/DL (ref 8.5–10.5)
CHLORIDE SERPL-SCNC: 106 MMOL/L (ref 96–112)
CO2 SERPL-SCNC: 23 MMOL/L (ref 20–33)
CREAT SERPL-MCNC: 0.85 MG/DL (ref 0.5–1.4)
GLOBULIN SER CALC-MCNC: 2.8 G/DL (ref 1.9–3.5)
GLUCOSE SERPL-MCNC: 106 MG/DL (ref 65–99)
POTASSIUM SERPL-SCNC: 3.7 MMOL/L (ref 3.6–5.5)
PROT SERPL-MCNC: 7.1 G/DL (ref 6–8.2)
SODIUM SERPL-SCNC: 142 MMOL/L (ref 135–145)

## 2018-03-22 PROCEDURE — 36415 COLL VENOUS BLD VENIPUNCTURE: CPT

## 2018-03-22 PROCEDURE — 82306 VITAMIN D 25 HYDROXY: CPT

## 2018-03-22 PROCEDURE — 80053 COMPREHEN METABOLIC PANEL: CPT

## 2018-03-29 ENCOUNTER — TELEPHONE (OUTPATIENT)
Dept: VASCULAR LAB | Facility: MEDICAL CENTER | Age: 58
End: 2018-03-29

## 2018-04-06 RX ORDER — FLUOXETINE HYDROCHLORIDE 20 MG/1
CAPSULE ORAL
Qty: 90 CAP | Refills: 0 | Status: SHIPPED | OUTPATIENT
Start: 2018-04-06 | End: 2018-07-21 | Stop reason: SDUPTHER

## 2018-04-26 ENCOUNTER — APPOINTMENT (OUTPATIENT)
Dept: VASCULAR LAB | Facility: MEDICAL CENTER | Age: 58
End: 2018-04-26
Payer: COMMERCIAL

## 2018-04-27 ENCOUNTER — OFFICE VISIT (OUTPATIENT)
Dept: VASCULAR LAB | Facility: MEDICAL CENTER | Age: 58
End: 2018-04-27
Attending: FAMILY MEDICINE
Payer: COMMERCIAL

## 2018-04-27 VITALS
BODY MASS INDEX: 34.98 KG/M2 | HEART RATE: 61 BPM | DIASTOLIC BLOOD PRESSURE: 62 MMHG | SYSTOLIC BLOOD PRESSURE: 118 MMHG | WEIGHT: 190.1 LBS | HEIGHT: 62 IN

## 2018-04-27 DIAGNOSIS — I10 ESSENTIAL HYPERTENSION: Chronic | ICD-10-CM

## 2018-04-27 DIAGNOSIS — R00.1 SINUS BRADYCARDIA BY ELECTROCARDIOGRAM: ICD-10-CM

## 2018-04-27 DIAGNOSIS — R94.31 NONSPECIFIC ABNORMAL ELECTROCARDIOGRAM (ECG) (EKG): ICD-10-CM

## 2018-04-27 DIAGNOSIS — E78.00 HYPERCHOLESTEROLEMIA: ICD-10-CM

## 2018-04-27 DIAGNOSIS — I45.10 INCOMPLETE RIGHT BUNDLE BRANCH BLOCK (RBBB) DETERMINED BY ELECTROCARDIOGRAPHY: ICD-10-CM

## 2018-04-27 DIAGNOSIS — R73.03 PREDIABETES: ICD-10-CM

## 2018-04-27 LAB — EKG IMPRESSION: NORMAL

## 2018-04-27 PROCEDURE — 93010 ELECTROCARDIOGRAM REPORT: CPT | Performed by: INTERNAL MEDICINE

## 2018-04-27 PROCEDURE — 99212 OFFICE O/P EST SF 10 MIN: CPT | Performed by: FAMILY MEDICINE

## 2018-04-27 PROCEDURE — 99214 OFFICE O/P EST MOD 30 MIN: CPT | Performed by: FAMILY MEDICINE

## 2018-04-27 PROCEDURE — 93005 ELECTROCARDIOGRAM TRACING: CPT | Performed by: FAMILY MEDICINE

## 2018-04-27 RX ORDER — HYDROCHLOROTHIAZIDE 12.5 MG/1
12.5 TABLET ORAL DAILY
Qty: 90 TAB | Refills: 3 | Status: SHIPPED | OUTPATIENT
Start: 2018-04-27 | End: 2019-05-01 | Stop reason: SDUPTHER

## 2018-04-27 ASSESSMENT — ENCOUNTER SYMPTOMS
NAUSEA: 0
SHORTNESS OF BREATH: 0
VOMITING: 0
DEPRESSION: 0
FALLS: 0
HEARTBURN: 0
DIZZINESS: 0
ORTHOPNEA: 0
BLURRED VISION: 0
MYALGIAS: 0
BLOOD IN STOOL: 0
WEIGHT LOSS: 0
COUGH: 0
PALPITATIONS: 0
CLAUDICATION: 0
DOUBLE VISION: 0

## 2018-04-27 NOTE — PROGRESS NOTES
FOLLOW UP VASCULAR VISIT  Subjective:   Carey Gutierrez is a 57 y.o. female who presents today 02/21/2018 for   Chief Complaint   Patient presents with   • Follow-Up     essential hypertention; review labs     HPI:  Here for f/u on HTN mgmt and review of recent labs.     Home BP:  Low 120s/low 60s    On Atorvastatin, denies leg pain or cramping  Tolerating Valsartan without dizziness  No acute symptoms.  Does report 1 episode of isolated right chest wall pain, substernal with pressure-like quality, lasted <1h.  No associated n/v/d or SOB or sweating.  Denies current pain.  Denies any alleviating or aggravating factors.      Reports remote ETT that was negative about 20yr ago.     Labs reviewed - of note, BS = 106, no A1c available.  Prior hx of mild high BS.  Lipids stable.        History   Smoking Status   • Former Smoker   • Quit date: 1/1/1989   Smokeless Tobacco   • Never Used     Comment: 1 ppd 7 yrs,quit 1989     Social History   Substance Use Topics   • Smoking status: Former Smoker     Quit date: 1/1/1989   • Smokeless tobacco: Never Used      Comment: 1 ppd 7 yrs,quit 1989   • Alcohol use 7.0 oz/week     14 Standard drinks or equivalent per week      Comment: rare         DIET AND EXERCISE:  Weight Change: weight loss of 5lbs   Diet: trying to control portion size and decrease CHO  Exercise: , yoga - moderate exercise  Illicit drug use - patient has a distant history of relatively heavy cocaine use    Review of Systems   Constitutional: Negative for malaise/fatigue and weight loss.   Eyes: Negative for blurred vision and double vision.   Respiratory: Negative for cough and shortness of breath.    Cardiovascular: Negative for chest pain, palpitations, orthopnea, claudication and leg swelling.   Gastrointestinal: Negative for blood in stool, heartburn, nausea and vomiting.   Genitourinary: Negative for hematuria.   Musculoskeletal: Negative for falls and myalgias.   Neurological:  "Negative for dizziness.   Psychiatric/Behavioral: Negative for depression.      Objective:     Vitals:    04/27/18 1427   BP: 118/62   Pulse: 61   Weight: 86.2 kg (190 lb 1.6 oz)   Height: 1.575 m (5' 2\")      BP Readings from Last 4 Encounters:   04/27/18 118/62   02/21/18 137/86   11/22/17 144/81   09/26/17 136/80     Body mass index is 34.77 kg/m².   BMI Readings from Last 4 Encounters:   04/27/18 34.77 kg/m²   02/21/18 34.97 kg/m²   11/22/17 34.68 kg/m²   09/26/17 34.20 kg/m²   ]  Physical Exam   Constitutional: She is oriented to person, place, and time. She appears well-developed.   Cardiovascular: Normal rate and normal heart sounds.  Exam reveals no gallop and no friction rub.    No murmur heard.  Pulmonary/Chest: Effort normal and breath sounds normal. No respiratory distress. She has no wheezes. She has no rales.   Musculoskeletal: She exhibits no edema or tenderness (at chest wall, sternum, costochondral ).   Neurological: She is alert and oriented to person, place, and time.   Skin: Skin is warm and dry.   Psychiatric: She has a normal mood and affect.   Vitals reviewed.    Lab Results   Component Value Date    CHOLSTRLTOT 134 01/31/2018    LDL 72 01/31/2018    HDL 45 01/31/2018    TRIGLYCERIDE 87 01/31/2018           Lab Results   Component Value Date    SODIUM 142 03/22/2018    POTASSIUM 3.7 03/22/2018    CHLORIDE 106 03/22/2018    CO2 23 03/22/2018    GLUCOSE 106 (H) 03/22/2018    BUN 24 (H) 03/22/2018    CREATININE 0.85 03/22/2018    IFAFR[place] >60 03/22/2018    IFNOTAFR >60 03/22/2018        Lab Results   Component Value Date    WBC 6.3 03/21/2017    RBC 4.77 03/21/2017    HEMOGLOBIN 14.0 03/21/2017    HEMATOCRIT 41.7 03/21/2017    MCV 87.4 03/21/2017    MCH 29.4 03/21/2017    MCHC 33.6 03/21/2017    MPV 10.1 03/21/2017        IMAGING:      EKG Interpretation    Interpreted by me    Rhythm: normal sinus   Rate: bradycardia and 50-60  Axis: normal  Ectopy: none  Conduction: right bundle branch block " (incomplete)  ST Segments: nonspecific changes  T Waves: inversion in  v3 and III  Q Waves: none    Clinical Impression: right bundle branch block, incomplete with SB      Medical Decision Making:  Today's Assessment / Status / Plan:     1. Essential hypertension     2. Nonspecific abnormal electrocardiogram (ECG) (EKG)  NM-HEART MUSCLE IMAGE,SPECT MULT    incomplete RBBB,  T-wave inversions III, v3   3. Incomplete right bundle branch block (RBBB) determined by electrocardiography  NM-HEART MUSCLE IMAGE,SPECT MULT   4. Hypercholesterolemia     5. Sinus bradycardia by electrocardiogram     6. Prediabetes  HEMOGLOBIN A1C (Glycohemoglobin GHB Total/A1C with MBG Estimate)     Patient Type: Primary Prevention    Etiology of Established CVD if Present:  None although does have a strong family history of TIA and stroke    Lipid Management:   Qualifies for Statin Therapy Based on 2013 ACC/AHA Guidelines: no  Calculated 10-Year Risk of ASCVD: 2.7%  Currently on Statin: Yes  Patient does not necessarily qualify for statin therapy based on ACC AHA guidelines  However these guidelines do not take into account her family history, her lifetime risk, or her illicit drug use in the distant past and as such may underestimate her risk.  She does seem to tolerate statins well  Goal LDL less than 100 and goal non-HDL less than 130 per national lipid Association guidelines.    At goal:  Yes   - Continue atorvastatin 10 mg daily  - Continue lifestyle modification as per below  - Recheck fasting lipid panel in 3 to 6 months    Blood Pressure Management:  Blood pressure goal based on ACC AHA guideline - less than 130/80 both at home and in the office  At goal:  Yes  - Continue valsartan 160 mg daily  - adjusted to HCTZ 12.5mg, lytes stable      Glycemic Status: PreDM  - check A1c, continue TLC measures, no indication for metformin at this time     Anti-Platelet/Anti-Coagulant Tx:   - continue ASA 81mg     Smoking: Continue complete  avoidance    Physical Activity: Continue excellent exercise regimen    Weight Management and Nutrition: Dietary plan was discussed with patient at this visit including going back to low carbohydrate diet with goal of losing 10 pounds    Other:     1. Incomplete RBBB with nonspecific T-wave inversions.  Some risk factors for conduction abnormalities due to prior cocaine usage.  - recommend Lexiscan   - continue all meds,   - no restrictions to activity but provided ED precautions       2. Obstructive sleep apnea. Continued CPAP and f/u with PCP    Instructed to follow-up with PCP for remainder of adult medical needs: yes  We will partner with other providers in the management of established vascular disease and cardiometabolic risk factors.    Studies to Be Obtained:  Lexiscan   Labs to Be Obtained: A1c     Follow up in: 6 weeks     Jack Beatty M.D.     CC: Kierra Barger

## 2018-05-01 ENCOUNTER — HOSPITAL ENCOUNTER (OUTPATIENT)
Dept: RADIOLOGY | Facility: MEDICAL CENTER | Age: 58
End: 2018-05-01
Attending: FAMILY MEDICINE
Payer: COMMERCIAL

## 2018-05-01 DIAGNOSIS — I45.10 INCOMPLETE RIGHT BUNDLE BRANCH BLOCK (RBBB) DETERMINED BY ELECTROCARDIOGRAPHY: ICD-10-CM

## 2018-05-01 DIAGNOSIS — R94.31 NONSPECIFIC ABNORMAL ELECTROCARDIOGRAM (ECG) (EKG): ICD-10-CM

## 2018-05-01 PROCEDURE — A9502 TC99M TETROFOSMIN: HCPCS

## 2018-05-01 RX ORDER — REGADENOSON 0.08 MG/ML
INJECTION, SOLUTION INTRAVENOUS
Status: DISCONTINUED
Start: 2018-05-01 | End: 2018-05-02 | Stop reason: HOSPADM

## 2018-05-04 ENCOUNTER — TELEPHONE (OUTPATIENT)
Dept: VASCULAR LAB | Facility: MEDICAL CENTER | Age: 58
End: 2018-05-04

## 2018-06-05 ENCOUNTER — HOSPITAL ENCOUNTER (OUTPATIENT)
Dept: LAB | Facility: MEDICAL CENTER | Age: 58
End: 2018-06-05
Attending: FAMILY MEDICINE
Payer: COMMERCIAL

## 2018-06-05 DIAGNOSIS — R73.03 PREDIABETES: ICD-10-CM

## 2018-06-05 LAB
EST. AVERAGE GLUCOSE BLD GHB EST-MCNC: 111 MG/DL
HBA1C MFR BLD: 5.5 % (ref 0–5.6)

## 2018-06-05 PROCEDURE — 36415 COLL VENOUS BLD VENIPUNCTURE: CPT

## 2018-06-05 PROCEDURE — 83036 HEMOGLOBIN GLYCOSYLATED A1C: CPT

## 2018-06-08 ENCOUNTER — OFFICE VISIT (OUTPATIENT)
Dept: VASCULAR LAB | Facility: MEDICAL CENTER | Age: 58
End: 2018-06-08
Attending: FAMILY MEDICINE
Payer: COMMERCIAL

## 2018-06-08 VITALS
WEIGHT: 193 LBS | BODY MASS INDEX: 35.51 KG/M2 | SYSTOLIC BLOOD PRESSURE: 127 MMHG | HEART RATE: 65 BPM | HEIGHT: 62 IN | DIASTOLIC BLOOD PRESSURE: 72 MMHG

## 2018-06-08 DIAGNOSIS — I45.10 INCOMPLETE RIGHT BUNDLE BRANCH BLOCK (RBBB) DETERMINED BY ELECTROCARDIOGRAPHY: ICD-10-CM

## 2018-06-08 DIAGNOSIS — E78.5 DYSLIPIDEMIA: ICD-10-CM

## 2018-06-08 DIAGNOSIS — G47.33 OSA (OBSTRUCTIVE SLEEP APNEA): ICD-10-CM

## 2018-06-08 DIAGNOSIS — E66.9 OBESITY (BMI 35.0-39.9 WITHOUT COMORBIDITY): ICD-10-CM

## 2018-06-08 DIAGNOSIS — I10 ESSENTIAL HYPERTENSION: Chronic | ICD-10-CM

## 2018-06-08 PROCEDURE — 99212 OFFICE O/P EST SF 10 MIN: CPT | Performed by: FAMILY MEDICINE

## 2018-06-08 PROCEDURE — 99214 OFFICE O/P EST MOD 30 MIN: CPT | Performed by: FAMILY MEDICINE

## 2018-06-08 ASSESSMENT — ENCOUNTER SYMPTOMS
BLOOD IN STOOL: 0
SHORTNESS OF BREATH: 0
DEPRESSION: 0
COUGH: 0
BLURRED VISION: 0
HEARTBURN: 0
FALLS: 0
DIZZINESS: 0
WEIGHT LOSS: 0
PALPITATIONS: 0
CLAUDICATION: 0
VOMITING: 0
DOUBLE VISION: 0
MYALGIAS: 0
ORTHOPNEA: 0
NAUSEA: 0

## 2018-06-08 NOTE — PATIENT INSTRUCTIONS
1) magnesium 500mg     2) continue Vit D 50,000 weekly     3) weight loss strategy 1-2lbs every 1-2 weeks, 20lb over 1 year     4) follow-up in 6mo with cholesterol

## 2018-07-10 ENCOUNTER — HOSPITAL ENCOUNTER (OUTPATIENT)
Dept: LAB | Facility: MEDICAL CENTER | Age: 58
End: 2018-07-10
Attending: INTERNAL MEDICINE
Payer: COMMERCIAL

## 2018-07-10 DIAGNOSIS — Z82.3 FAMILY HISTORY OF STROKE: ICD-10-CM

## 2018-07-10 DIAGNOSIS — I10 HTN, GOAL BELOW 130/80: Chronic | ICD-10-CM

## 2018-07-10 DIAGNOSIS — E55.9 VITAMIN D DEFICIENCY: ICD-10-CM

## 2018-07-10 LAB
25(OH)D3 SERPL-MCNC: 52 NG/ML (ref 30–100)
ALBUMIN SERPL BCP-MCNC: 4.4 G/DL (ref 3.2–4.9)
ALBUMIN/GLOB SERPL: 1.6 G/DL
ALP SERPL-CCNC: 83 U/L (ref 30–99)
ALT SERPL-CCNC: 26 U/L (ref 2–50)
ANION GAP SERPL CALC-SCNC: 11 MMOL/L (ref 0–11.9)
AST SERPL-CCNC: 21 U/L (ref 12–45)
BASOPHILS # BLD AUTO: 0.6 % (ref 0–1.8)
BASOPHILS # BLD: 0.04 K/UL (ref 0–0.12)
BILIRUB SERPL-MCNC: 0.5 MG/DL (ref 0.1–1.5)
BUN SERPL-MCNC: 22 MG/DL (ref 8–22)
CALCIUM SERPL-MCNC: 10 MG/DL (ref 8.5–10.5)
CHLORIDE SERPL-SCNC: 104 MMOL/L (ref 96–112)
CHOLEST SERPL-MCNC: 155 MG/DL (ref 100–199)
CO2 SERPL-SCNC: 26 MMOL/L (ref 20–33)
CREAT SERPL-MCNC: 0.81 MG/DL (ref 0.5–1.4)
EOSINOPHIL # BLD AUTO: 0.26 K/UL (ref 0–0.51)
EOSINOPHIL NFR BLD: 3.9 % (ref 0–6.9)
ERYTHROCYTE [DISTWIDTH] IN BLOOD BY AUTOMATED COUNT: 41.2 FL (ref 35.9–50)
GLOBULIN SER CALC-MCNC: 2.8 G/DL (ref 1.9–3.5)
GLUCOSE SERPL-MCNC: 96 MG/DL (ref 65–99)
HCT VFR BLD AUTO: 41.9 % (ref 37–47)
HDLC SERPL-MCNC: 44 MG/DL
HGB BLD-MCNC: 14.1 G/DL (ref 12–16)
IMM GRANULOCYTES # BLD AUTO: 0.02 K/UL (ref 0–0.11)
IMM GRANULOCYTES NFR BLD AUTO: 0.3 % (ref 0–0.9)
LDLC SERPL CALC-MCNC: 76 MG/DL
LYMPHOCYTES # BLD AUTO: 2 K/UL (ref 1–4.8)
LYMPHOCYTES NFR BLD: 29.7 % (ref 22–41)
MCH RBC QN AUTO: 30.1 PG (ref 27–33)
MCHC RBC AUTO-ENTMCNC: 33.7 G/DL (ref 33.6–35)
MCV RBC AUTO: 89.3 FL (ref 81.4–97.8)
MONOCYTES # BLD AUTO: 0.41 K/UL (ref 0–0.85)
MONOCYTES NFR BLD AUTO: 6.1 % (ref 0–13.4)
NEUTROPHILS # BLD AUTO: 4 K/UL (ref 2–7.15)
NEUTROPHILS NFR BLD: 59.4 % (ref 44–72)
NRBC # BLD AUTO: 0 K/UL
NRBC BLD-RTO: 0 /100 WBC
PLATELET # BLD AUTO: 258 K/UL (ref 164–446)
PMV BLD AUTO: 9.8 FL (ref 9–12.9)
POTASSIUM SERPL-SCNC: 3.8 MMOL/L (ref 3.6–5.5)
PROT SERPL-MCNC: 7.2 G/DL (ref 6–8.2)
RBC # BLD AUTO: 4.69 M/UL (ref 4.2–5.4)
SODIUM SERPL-SCNC: 141 MMOL/L (ref 135–145)
TRIGL SERPL-MCNC: 177 MG/DL (ref 0–149)
WBC # BLD AUTO: 6.7 K/UL (ref 4.8–10.8)

## 2018-07-10 PROCEDURE — 80061 LIPID PANEL: CPT

## 2018-07-10 PROCEDURE — 82306 VITAMIN D 25 HYDROXY: CPT

## 2018-07-10 PROCEDURE — 85025 COMPLETE CBC W/AUTO DIFF WBC: CPT

## 2018-07-10 PROCEDURE — 36415 COLL VENOUS BLD VENIPUNCTURE: CPT

## 2018-07-10 PROCEDURE — 80053 COMPREHEN METABOLIC PANEL: CPT

## 2018-07-21 RX ORDER — FLUOXETINE HYDROCHLORIDE 20 MG/1
CAPSULE ORAL
Qty: 90 CAP | Refills: 3 | Status: SHIPPED | OUTPATIENT
Start: 2018-07-21 | End: 2019-10-22 | Stop reason: SDUPTHER

## 2018-07-21 NOTE — TELEPHONE ENCOUNTER
Was the patient seen in the last year in this department? Yes     Does patient have an active prescription for medications requested? No     Received Request Via: Pharmacy     Last OV 9/26/18, last labs 7/10/18

## 2018-07-23 ENCOUNTER — TELEPHONE (OUTPATIENT)
Dept: VASCULAR LAB | Facility: MEDICAL CENTER | Age: 58
End: 2018-07-23

## 2018-07-23 DIAGNOSIS — I10 ESSENTIAL HYPERTENSION: Chronic | ICD-10-CM

## 2018-07-23 RX ORDER — LOSARTAN POTASSIUM 50 MG/1
50 TABLET ORAL DAILY
Qty: 90 TAB | Refills: 3 | Status: SHIPPED | OUTPATIENT
Start: 2018-07-23 | End: 2019-10-15 | Stop reason: SDUPTHER

## 2018-07-23 NOTE — TELEPHONE ENCOUNTER
Received request from pharmacy for new replacement for valsartan as supply has been limited with the recall.  DC'd valsartan.  Start Losartan 50mg-- titrate up as needed for BP <130/80.  MA to inform pt.    Shanthi DIGGS  [facility]

## 2018-08-08 NOTE — TELEPHONE ENCOUNTER
Vanessa Sutton is here today for Follow-up and URI (2 days)    Concerns/symptoms: routine follow up  Medications: medications verified and updated  Refills needed today? No     Tobacco history: verified  Advanced Directives: No not on file, not interested.  BP greater than 140/90? No    Patient would like communication of their results via:      Cell Phone:   Telephone Information:   Mobile [phone]     Okay to leave a message containing results? Yes  Preferred language:  English.    Health Maintenance Due   Topic Date Due   • Diabetes Foot Exam  04/05/2018   • Diabetes A1C  09/28/2018      Patient is up to date, no discussion needed.    Medicare HRA:           Was the patient seen in the last year in this department? Yes     Does patient have an active prescription for medications requested? No     Received Request Via: Pharmacy      Pt met protocol?: No pt last ov 9/2017

## 2018-08-23 ENCOUNTER — TELEPHONE (OUTPATIENT)
Dept: VASCULAR LAB | Facility: MEDICAL CENTER | Age: 58
End: 2018-08-23

## 2018-08-23 NOTE — TELEPHONE ENCOUNTER
Received a call from [facility] today informing that the patient is refusing to participate in the study due to cost.    Damian Klein, Med. Ass'  [facility]

## 2018-09-19 ENCOUNTER — OFFICE VISIT (OUTPATIENT)
Dept: MEDICAL GROUP | Facility: PHYSICIAN GROUP | Age: 58
End: 2018-09-19
Payer: COMMERCIAL

## 2018-09-19 VITALS
HEART RATE: 60 BPM | BODY MASS INDEX: 34.96 KG/M2 | RESPIRATION RATE: 18 BRPM | TEMPERATURE: 98.6 F | DIASTOLIC BLOOD PRESSURE: 72 MMHG | HEIGHT: 62 IN | WEIGHT: 190 LBS | SYSTOLIC BLOOD PRESSURE: 116 MMHG | OXYGEN SATURATION: 94 %

## 2018-09-19 DIAGNOSIS — F41.9 ANXIETY: ICD-10-CM

## 2018-09-19 DIAGNOSIS — E55.9 VITAMIN D DEFICIENCY: ICD-10-CM

## 2018-09-19 DIAGNOSIS — E78.00 HYPERCHOLESTEROLEMIA: Chronic | ICD-10-CM

## 2018-09-19 DIAGNOSIS — Z23 NEEDS FLU SHOT: ICD-10-CM

## 2018-09-19 DIAGNOSIS — I10 ESSENTIAL HYPERTENSION: Chronic | ICD-10-CM

## 2018-09-19 DIAGNOSIS — F33.9 EPISODE OF RECURRENT MAJOR DEPRESSIVE DISORDER, UNSPECIFIED DEPRESSION EPISODE SEVERITY (HCC): ICD-10-CM

## 2018-09-19 DIAGNOSIS — K92.1 BLOOD IN STOOL: ICD-10-CM

## 2018-09-19 DIAGNOSIS — E66.9 CLASS 1 OBESITY WITHOUT SERIOUS COMORBIDITY WITH BODY MASS INDEX (BMI) OF 34.0 TO 34.9 IN ADULT, UNSPECIFIED OBESITY TYPE: ICD-10-CM

## 2018-09-19 PROBLEM — E78.5 DYSLIPIDEMIA: Status: RESOLVED | Noted: 2017-11-22 | Resolved: 2018-09-19

## 2018-09-19 PROBLEM — S80.12XA CONTUSION OF LEFT LOWER LEG: Status: RESOLVED | Noted: 2017-09-26 | Resolved: 2018-09-19

## 2018-09-19 PROCEDURE — 90471 IMMUNIZATION ADMIN: CPT | Performed by: INTERNAL MEDICINE

## 2018-09-19 PROCEDURE — 90686 IIV4 VACC NO PRSV 0.5 ML IM: CPT | Performed by: INTERNAL MEDICINE

## 2018-09-19 PROCEDURE — 99214 OFFICE O/P EST MOD 30 MIN: CPT | Mod: 25 | Performed by: INTERNAL MEDICINE

## 2018-09-19 ASSESSMENT — PATIENT HEALTH QUESTIONNAIRE - PHQ9
SUM OF ALL RESPONSES TO PHQ QUESTIONS 1-9: 1
1. LITTLE INTEREST OR PLEASURE IN DOING THINGS: NOT AT ALL
8. MOVING OR SPEAKING SO SLOWLY THAT OTHER PEOPLE COULD HAVE NOTICED. OR THE OPPOSITE, BEING SO FIGETY OR RESTLESS THAT YOU HAVE BEEN MOVING AROUND A LOT MORE THAN USUAL: NOT AT ALL
6. FEELING BAD ABOUT YOURSELF - OR THAT YOU ARE A FAILURE OR HAVE LET YOURSELF OR YOUR FAMILY DOWN: NOT AL ALL
SUM OF ALL RESPONSES TO PHQ9 QUESTIONS 1 AND 2: 0
5. POOR APPETITE OR OVEREATING: SEVERAL DAYS
2. FEELING DOWN, DEPRESSED, IRRITABLE, OR HOPELESS: NOT AT ALL
7. TROUBLE CONCENTRATING ON THINGS, SUCH AS READING THE NEWSPAPER OR WATCHING TELEVISION: NOT AT ALL
4. FEELING TIRED OR HAVING LITTLE ENERGY: NOT AT ALL
9. THOUGHTS THAT YOU WOULD BE BETTER OFF DEAD, OR OF HURTING YOURSELF: NOT AT ALL
3. TROUBLE FALLING OR STAYING ASLEEP OR SLEEPING TOO MUCH: NOT AT ALL

## 2018-09-19 ASSESSMENT — PAIN SCALES - GENERAL: PAINLEVEL: NO PAIN

## 2018-09-19 NOTE — PROGRESS NOTES
Chief Complaint   Patient presents with   • Hypertension     LAB RESULTS    • Weight Loss     weight management    • Bloody Stools     blood in stool x 1 week ago        HISTORY OF PRESENT ILLNESS: Patient is a 57 y.o. female established patient who presents today to discuss the medical issues below.    Obesity  Weight stable, continues to work on diet.  She is requesting ideal body weight evaluation for weight watchers.      MDD (major depressive disorder)  Continues on Prozac since her 20's.      Hypercholesterolemia  Continues on statin as atorvastatin 10 mg a day.  Patient has seen vascular, does not meet National [place] of health recommendations for atorvastatin however, she has a high family history of stroke and feels the benefits outweigh the risks.    Essential hypertension  contineus on losartan and hctz. No chest pain palpitations or edema.  Home readings in the 120/70 range.      Vitamin D deficiency  Patient currently takes her vitamin D about once a week wonders if she needs to continue to take that.    Anxiety  States under more stress recently.  Teen son with depression, improving.      Blood in stool  patient noted string of bright red blood after BM last week, she had been constipated.  She is current with colonoscopy at age 51 with Dr Archuleta, was recommended 8 yr follow up due to mom ? History of polyps.      Patient Active Problem List    Diagnosis Date Noted   • Obesity 09/30/2013     Priority: Medium   • Essential hypertension 02/05/2013     Priority: Medium   • Hypercholesterolemia 02/05/2013     Priority: Medium   • MDD (major depressive disorder) 01/29/2013     Priority: Medium   • Blood in stool 09/19/2018   • Incomplete right bundle branch block (RBBB) determined by electrocardiography 04/27/2018   • History of sciatica 03/30/2017   • Vitamin D deficiency 12/31/2015   • Fatigue 12/31/2015   • Eczema, dyshidrotic 11/12/2013   • SNORING DISORDER 02/20/2013   • Anxiety 01/29/2013        Allergies:Erythromycin and Lisinopril    Current Outpatient Prescriptions   Medication Sig Dispense Refill   • Zoster Vac Recomb Adjuvanted (SHINGRIX) 50 MCG Recon Susp 0.5 mL by Intramuscular route Once for 1 dose. 0.5 mL 0   • losartan (COZAAR) 50 MG Tab Take 1 Tab by mouth every day. 90 Tab 3   • FLUoxetine (PROZAC) 20 MG Cap TAKE 1 CAPSULE BY MOUTH ONCE DAILY 90 Cap 3   • hydroCHLOROthiazide (HYDRODIURIL) 12.5 MG tablet Take 1 Tab by mouth every day for 360 days. 90 Tab 3   • vitamin D, Ergocalciferol, (DRISDOL) 06546 units Cap capsule TAKE 1 CAPSULE BY MOUTH THREE TIMES PER WEEK 12 Cap 1   • atorvastatin (LIPITOR) 10 MG Tab Take 1 Tab by mouth every day. 30 Tab 11   • aspirin (ASA) 81 MG Chew Tab chewable tablet Take 1 Tab by mouth every day. 100 Tab 10     No current facility-administered medications for this visit.          Past Medical History:   Diagnosis Date   • depression     taking prozac,wellbutrin   • History of sciatica 3/30/2017   • Hyperlipidemia    • Hypertension    • SURI (obstructive sleep apnea)    • Other specified symptom associated with female genital organs        Social History   Substance Use Topics   • Smoking status: Former Smoker     Quit date: 1/1/1989   • Smokeless tobacco: Never Used      Comment: 1 ppd 7 yrs,quit 1989   • Alcohol use 7.0 oz/week     14 Standard drinks or equivalent per week      Comment: rare       Family Status   Relation Status   • Mo (Not Specified)   • Fa (Not Specified)   • Bro (Not Specified)   • PGMo (Not Specified)     Family History   Problem Relation Age of Onset   • Hypertension Mother    • Stroke Mother 74   • Hypertension Father    • Stroke Father 65        tia   • Diabetes Brother         DMII   • Heart Disease Paternal Grandmother 55        fatal - no details available       ROS:    Respiratory: Negative for cough, sputum production, shortness of breath or wheezing.    Cardiovascular: Negative for chest pain, palpitations, orthopnea, dyspnea  "with exertion or edema.   Gastrointestinal: Negative for GI upset, nausea, vomiting, abdominal pain, constipation or diarrhea.   Genitourinary: Negative for dysuria, urgency, hesitancy or frequency.       Exam:    Blood pressure 116/72, pulse 60, temperature 37 °C (98.6 °F), temperature source Temporal, resp. rate 18, height 1.575 m (5' 2\"), weight 86.2 kg (190 lb), last menstrual period 01/16/2010, SpO2 94 %, not currently breastfeeding.  General:  Well nourished, well developed female in NAD.  Pulmonary: Clear to ausculation and percussion.  Normal effort. No rales, rhonchi, or wheezing.  Cardiovascular: Regular rate and rhythm without murmur.   Abdomen: Normal bowel sounds soft and nontender no palpable liver spleen bladder mass.  Extremities: No LE edema noted.  Neuro: Grossly nonfocal.  Psych: Alert and oriented to person, place, and time. Appropriate mood and conversation.    LABS: Results reviewed and discussed with the patient, questions answered.      This dictation was created using voice recognition software. I have made reasonable attempts to correct errors, however, errors of grammar and content may exist.          Assessment/Plan:    1. Class 1 obesity without serious comorbidity with body mass index (BMI) of 34.0 to 34.9 in adult, unspecified obesity type  Reviewed diet exercise weight loss.  Letter for goal of 165 for weight watchers.  Letter written.    2. Episode of recurrent major depressive disorder, unspecified depression episode severity (HCC)  Managing well continues on Prozac support given offered counseling as needed she will let us know if she feels she is ready to move to that direction.    3. Hypercholesterolemia  Well-controlled slightly elevated triglycerides discussed implications monitor with dietary changes  - LIPID PROFILE; Future    4. Essential hypertension  Well-controlled continuing on medications intact renal function  - COMP METABOLIC PANEL; Future  - CBC WITH DIFFERENTIAL; " "Future    5. Vitamin D deficiency  Vitamin D level at 52 adequately replaced she is taking it once in a while.  Discussed risks benefits ongoing monitoring.  - VITAMIN D,25 HYDROXY; Future    6. Anxiety  Doing well currently as #2 above    7. Blood in stool  Current on colonoscopy screening.  By history most consistent with hemorrhoids, internal, discussed management monitoring off her stool card if this is positive would recommend early follow-up with GI.  Patient low risk.  - OCCULT BLOOD FECES IMMUNOASSAY; Future    8. Needs flu shot  Patient also candidate for shin Grix discussed.  Prescription sent to the pharmacy as we do not have it and insurance coverage is questionable.  Discussed with patient.    \" Influenza Vaccine Quad Injection >3Y (PF)    Patient was seen for 25 minutes face to face of which more than 50% of the time was spent in counseling and coordination of care regarding the above problems.         "

## 2018-09-19 NOTE — ASSESSMENT & PLAN NOTE
Patient currently takes her vitamin D about once a week wonders if she needs to continue to take that.

## 2018-09-19 NOTE — LETTER
September 19, 2018         Patient: Carey Gutierrez   YOB: 1960   Date of Visit: 9/19/2018           To Whom it May Concern:    Carey Gutierrez was seen in my clinic on 9/19/2018. Patient is currently enrolled in Weight watchers.  I believe an ideal weight goal for this patient is #165.    If you have any questions or concerns, please don't hesitate to call.        Sincerely,           Kierra MAGALLANES M.D.  Electronically Signed

## 2018-09-19 NOTE — ASSESSMENT & PLAN NOTE
Weight stable, continues to work on diet.  She is requesting ideal body weight evaluation for weight watchers.

## 2018-09-19 NOTE — ASSESSMENT & PLAN NOTE
Continues on statin as atorvastatin 10 mg a day.  Patient has seen vascular, does not meet National [place] of health recommendations for atorvastatin however, she has a high family history of stroke and feels the benefits outweigh the risks.

## 2018-09-19 NOTE — ASSESSMENT & PLAN NOTE
contineus on losartan and hctz. No chest pain palpitations or edema.  Home readings in the 120/70 range.

## 2018-09-19 NOTE — ASSESSMENT & PLAN NOTE
patient noted string of bright red blood after BM last week, she had been constipated.  She is current with colonoscopy at age 51 with Dr Archuleta, was recommended 8 yr follow up due to mom ? History of polyps.

## 2018-09-20 ENCOUNTER — HOSPITAL ENCOUNTER (OUTPATIENT)
Facility: MEDICAL CENTER | Age: 58
End: 2018-09-20
Attending: INTERNAL MEDICINE
Payer: COMMERCIAL

## 2018-09-20 PROCEDURE — 82274 ASSAY TEST FOR BLOOD FECAL: CPT

## 2018-09-29 DIAGNOSIS — K92.1 BLOOD IN STOOL: ICD-10-CM

## 2018-10-01 LAB — HEMOCCULT STL QL IA: NEGATIVE

## 2018-10-05 RX ORDER — ERGOCALCIFEROL 1.25 MG/1
CAPSULE ORAL
Qty: 12 CAP | Refills: 1 | Status: SHIPPED | OUTPATIENT
Start: 2018-10-05 | End: 2019-07-10 | Stop reason: SDUPTHER

## 2018-10-05 NOTE — TELEPHONE ENCOUNTER
Was the patient seen in the last year in this department? Yes    Does patient have an active prescription for medications requested? No     Received Request Via: Pharmacy    Pt met protocol?: Yes     Last OV 09/2018    Last Vit D lab done 7/10/2018 with a value of 52

## 2018-12-07 ENCOUNTER — APPOINTMENT (OUTPATIENT)
Dept: VASCULAR LAB | Facility: MEDICAL CENTER | Age: 58
End: 2018-12-07
Payer: COMMERCIAL

## 2019-03-14 ENCOUNTER — HOSPITAL ENCOUNTER (OUTPATIENT)
Dept: LAB | Facility: MEDICAL CENTER | Age: 59
End: 2019-03-14
Attending: INTERNAL MEDICINE
Payer: COMMERCIAL

## 2019-03-14 DIAGNOSIS — I10 ESSENTIAL HYPERTENSION: Chronic | ICD-10-CM

## 2019-03-14 DIAGNOSIS — E55.9 VITAMIN D DEFICIENCY: ICD-10-CM

## 2019-03-14 DIAGNOSIS — E78.00 HYPERCHOLESTEROLEMIA: Chronic | ICD-10-CM

## 2019-03-14 LAB
25(OH)D3 SERPL-MCNC: 48 NG/ML (ref 30–100)
ALBUMIN SERPL BCP-MCNC: 4.4 G/DL (ref 3.2–4.9)
ALBUMIN/GLOB SERPL: 1.5 G/DL
ALP SERPL-CCNC: 79 U/L (ref 30–99)
ALT SERPL-CCNC: 28 U/L (ref 2–50)
ANION GAP SERPL CALC-SCNC: 6 MMOL/L (ref 0–11.9)
AST SERPL-CCNC: 19 U/L (ref 12–45)
BASOPHILS # BLD AUTO: 0.6 % (ref 0–1.8)
BASOPHILS # BLD: 0.05 K/UL (ref 0–0.12)
BILIRUB SERPL-MCNC: 0.6 MG/DL (ref 0.1–1.5)
BUN SERPL-MCNC: 17 MG/DL (ref 8–22)
CALCIUM SERPL-MCNC: 10.2 MG/DL (ref 8.5–10.5)
CHLORIDE SERPL-SCNC: 103 MMOL/L (ref 96–112)
CHOLEST SERPL-MCNC: 156 MG/DL (ref 100–199)
CO2 SERPL-SCNC: 30 MMOL/L (ref 20–33)
CREAT SERPL-MCNC: 0.84 MG/DL (ref 0.5–1.4)
EOSINOPHIL # BLD AUTO: 1.02 K/UL (ref 0–0.51)
EOSINOPHIL NFR BLD: 12.5 % (ref 0–6.9)
ERYTHROCYTE [DISTWIDTH] IN BLOOD BY AUTOMATED COUNT: 41.9 FL (ref 35.9–50)
FASTING STATUS PATIENT QL REPORTED: NORMAL
GLOBULIN SER CALC-MCNC: 3 G/DL (ref 1.9–3.5)
GLUCOSE SERPL-MCNC: 88 MG/DL (ref 65–99)
HCT VFR BLD AUTO: 45 % (ref 37–47)
HDLC SERPL-MCNC: 44 MG/DL
HGB BLD-MCNC: 14.6 G/DL (ref 12–16)
IMM GRANULOCYTES # BLD AUTO: 0.07 K/UL (ref 0–0.11)
IMM GRANULOCYTES NFR BLD AUTO: 0.9 % (ref 0–0.9)
LDLC SERPL CALC-MCNC: 92 MG/DL
LYMPHOCYTES # BLD AUTO: 2.39 K/UL (ref 1–4.8)
LYMPHOCYTES NFR BLD: 29.3 % (ref 22–41)
MCH RBC QN AUTO: 29.1 PG (ref 27–33)
MCHC RBC AUTO-ENTMCNC: 32.4 G/DL (ref 33.6–35)
MCV RBC AUTO: 89.8 FL (ref 81.4–97.8)
MONOCYTES # BLD AUTO: 0.48 K/UL (ref 0–0.85)
MONOCYTES NFR BLD AUTO: 5.9 % (ref 0–13.4)
NEUTROPHILS # BLD AUTO: 4.16 K/UL (ref 2–7.15)
NEUTROPHILS NFR BLD: 50.8 % (ref 44–72)
NRBC # BLD AUTO: 0 K/UL
NRBC BLD-RTO: 0 /100 WBC
PLATELET # BLD AUTO: 311 K/UL (ref 164–446)
PMV BLD AUTO: 9.8 FL (ref 9–12.9)
POTASSIUM SERPL-SCNC: 3.9 MMOL/L (ref 3.6–5.5)
PROT SERPL-MCNC: 7.4 G/DL (ref 6–8.2)
RBC # BLD AUTO: 5.01 M/UL (ref 4.2–5.4)
SODIUM SERPL-SCNC: 139 MMOL/L (ref 135–145)
TRIGL SERPL-MCNC: 99 MG/DL (ref 0–149)
WBC # BLD AUTO: 8.2 K/UL (ref 4.8–10.8)

## 2019-03-14 PROCEDURE — 80061 LIPID PANEL: CPT

## 2019-03-14 PROCEDURE — 85025 COMPLETE CBC W/AUTO DIFF WBC: CPT

## 2019-03-14 PROCEDURE — 82306 VITAMIN D 25 HYDROXY: CPT

## 2019-03-14 PROCEDURE — 80053 COMPREHEN METABOLIC PANEL: CPT

## 2019-03-14 PROCEDURE — 36415 COLL VENOUS BLD VENIPUNCTURE: CPT

## 2019-03-21 ENCOUNTER — OFFICE VISIT (OUTPATIENT)
Dept: MEDICAL GROUP | Facility: PHYSICIAN GROUP | Age: 59
End: 2019-03-21
Payer: COMMERCIAL

## 2019-03-21 VITALS
OXYGEN SATURATION: 92 % | BODY MASS INDEX: 36.62 KG/M2 | HEART RATE: 67 BPM | SYSTOLIC BLOOD PRESSURE: 120 MMHG | RESPIRATION RATE: 18 BRPM | TEMPERATURE: 97.9 F | DIASTOLIC BLOOD PRESSURE: 84 MMHG | WEIGHT: 199 LBS | HEIGHT: 62 IN

## 2019-03-21 DIAGNOSIS — E66.9 CLASS 1 OBESITY WITHOUT SERIOUS COMORBIDITY WITH BODY MASS INDEX (BMI) OF 34.0 TO 34.9 IN ADULT, UNSPECIFIED OBESITY TYPE: ICD-10-CM

## 2019-03-21 DIAGNOSIS — E78.5 HYPERLIPIDEMIA, UNSPECIFIED HYPERLIPIDEMIA TYPE: ICD-10-CM

## 2019-03-21 DIAGNOSIS — M17.12 PRIMARY OSTEOARTHRITIS OF LEFT KNEE: ICD-10-CM

## 2019-03-21 DIAGNOSIS — E55.9 VITAMIN D DEFICIENCY: ICD-10-CM

## 2019-03-21 DIAGNOSIS — I10 ESSENTIAL HYPERTENSION: Chronic | ICD-10-CM

## 2019-03-21 DIAGNOSIS — F41.9 ANXIETY: ICD-10-CM

## 2019-03-21 PROCEDURE — 99214 OFFICE O/P EST MOD 30 MIN: CPT | Performed by: INTERNAL MEDICINE

## 2019-03-21 RX ORDER — MELOXICAM 15 MG/1
15 TABLET ORAL DAILY
COMMUNITY
End: 2020-08-24

## 2019-03-21 NOTE — PROGRESS NOTES
Chief Complaint   Patient presents with   • Hypertension     lab results        HISTORY OF PRESENT ILLNESS: Patient is a 58 y.o. female established patient who presents today to discuss the medical issues below.    Essential hypertension  Patient continues on the cozaar, not following at home.      Primary osteoarthritis of left knee  Patient self referred to Dr Hudson for left knee aching.  Had xrays, we do not have records.  Patient was placed on meloxicam and patient reports that is doing well, she has referral to physical therapy to get started back on exercise work out.     Obesity  Weight is up at the 199 range.  She reports celery diet set of diarrhea, she reports she still has bouts of this if high fiber diet.      Anxiety  Continues stressed, feels she is doing well with this, she is on the prozac.        Patient Active Problem List    Diagnosis Date Noted   • Obesity 09/30/2013     Priority: Medium   • Essential hypertension 02/05/2013     Priority: Medium   • Hypercholesterolemia 02/05/2013     Priority: Medium   • MDD (major depressive disorder) 01/29/2013     Priority: Medium   • Primary osteoarthritis of left knee 03/21/2019   • Blood in stool 09/19/2018   • Incomplete right bundle branch block (RBBB) determined by electrocardiography 04/27/2018   • History of sciatica 03/30/2017   • Vitamin D deficiency 12/31/2015   • Fatigue 12/31/2015   • Eczema, dyshidrotic 11/12/2013   • SNORING DISORDER 02/20/2013   • Anxiety 01/29/2013       Allergies:Erythromycin and Lisinopril    Current Outpatient Prescriptions   Medication Sig Dispense Refill   • meloxicam (MOBIC) 15 MG tablet Take 15 mg by mouth every day.     • atorvastatin (LIPITOR) 10 MG Tab TAKE ONE TABLET BY MOUTH ONCE DAILY 90 Tab 3   • vitamin D, Ergocalciferol, (DRISDOL) 08566 units Cap capsule TAKE 1 CAPSULE BY MOUTH 3 TIMES PER WEEK 12 Cap 1   • losartan (COZAAR) 50 MG Tab Take 1 Tab by mouth every day. 90 Tab 3   • FLUoxetine (PROZAC) 20 MG  "Cap TAKE 1 CAPSULE BY MOUTH ONCE DAILY 90 Cap 3   • hydroCHLOROthiazide (HYDRODIURIL) 12.5 MG tablet Take 1 Tab by mouth every day for 360 days. 90 Tab 3   • aspirin (ASA) 81 MG Chew Tab chewable tablet Take 1 Tab by mouth every day. 100 Tab 10     No current facility-administered medications for this visit.          Past Medical History:   Diagnosis Date   • depression     taking prozac,wellbutrin   • History of sciatica 3/30/2017   • Hyperlipidemia    • Hypertension    • SURI (obstructive sleep apnea)    • Other specified symptom associated with female genital organs        Social History   Substance Use Topics   • Smoking status: Former Smoker     Quit date: 1/1/1989   • Smokeless tobacco: Never Used      Comment: 1 ppd 7 yrs,quit 1989   • Alcohol use 7.0 oz/week     14 Standard drinks or equivalent per week      Comment: rare       Family Status   Relation Status   • Mo (Not Specified)   • Fa (Not Specified)   • Bro (Not Specified)   • PGMo (Not Specified)     Family History   Problem Relation Age of Onset   • Hypertension Mother    • Stroke Mother 74   • Hypertension Father    • Stroke Father 65        tia   • Diabetes Brother         DMII   • Heart Disease Paternal Grandmother 55        fatal - no details available       ROS:    Respiratory: Negative for cough, sputum production, shortness of breath or wheezing.    Cardiovascular: Negative for chest pain, palpitations, orthopnea, dyspnea with exertion or edema.   Gastrointestinal: Negative for GI upset, nausea, vomiting, abdominal pain, constipation or diarrhea.   Genitourinary: Negative for dysuria, urgency, hesitancy or frequency.       Exam:    Blood pressure 120/84, pulse 67, temperature 36.6 °C (97.9 °F), temperature source Temporal, resp. rate 18, height 1.575 m (5' 2\"), weight 90.3 kg (199 lb), last menstrual period 01/16/2010, SpO2 92 %, not currently breastfeeding.  General:  Well nourished, well developed female in NAD.  HENT: Normocephalic, bilateral " TMs are intact, nasal and oral mucosa with no lesions,   Neck: Supple without bruit. Thyroid is not enlarged.  Pulmonary: Clear to ausculation and percussion.  Normal effort. No rales, rhonchi, or wheezing.  Cardiovascular: Regular rate and rhythm without murmur.   Abdomen: Normal bowel sounds soft and nontender no palpable liver spleen bladder mass.  Extremities: No LE edema noted.  Neuro: Grossly nonfocal.  Psych: Alert and oriented to person, place, and time. Appropriate mood and conversation.    LABS: Results reviewed and discussed with the patient, questions answered.    This dictation was created using voice recognition software. I have made reasonable attempts to correct errors, however, errors of grammar and content may exist.          Assessment/Plan:    1. Essential hypertension  Stable on meds, implications of diet and weight discussed, reviewed exercise.    2. Primary osteoarthritis of left knee  Monitor with return to exercise, ongoing meloxicam, encouraged weight loss    3. Class 1 obesity without serious comorbidity with body mass index (BMI) of 34.0 to 34.9 in adult, unspecified obesity type  Discussed diet, exercise, weight loss, behavioral modification, portion size management.        4. Anxiety  Patient is able to identify some areas of ongoing stress.  She generally feels things are going well.  Support given.  Option for stress management referral to local clinical counseling made.       Patient was seen for 25 minutes face to face of which more than 50% of the time was spent in counseling and coordination of care regarding the above problems.

## 2019-03-21 NOTE — ASSESSMENT & PLAN NOTE
Patient self referred to Dr Hudson for left knee aching.  Had xrays, we do not have records.  Patient was placed on meloxicam and patient reports that is doing well, she has referral to physical therapy to get started back on exercise work out.

## 2019-03-21 NOTE — ASSESSMENT & PLAN NOTE
Weight is up at the 199 range.  She reports celery diet set of diarrhea, she reports she still has bouts of this if high fiber diet.

## 2019-10-11 ENCOUNTER — HOSPITAL ENCOUNTER (OUTPATIENT)
Dept: LAB | Facility: MEDICAL CENTER | Age: 59
End: 2019-10-11
Attending: INTERNAL MEDICINE
Payer: COMMERCIAL

## 2019-10-11 DIAGNOSIS — I10 ESSENTIAL HYPERTENSION: Chronic | ICD-10-CM

## 2019-10-11 DIAGNOSIS — E55.9 VITAMIN D DEFICIENCY: ICD-10-CM

## 2019-10-11 DIAGNOSIS — E78.5 HYPERLIPIDEMIA, UNSPECIFIED HYPERLIPIDEMIA TYPE: ICD-10-CM

## 2019-10-11 LAB
25(OH)D3 SERPL-MCNC: 67 NG/ML (ref 30–100)
ALBUMIN SERPL BCP-MCNC: 4.5 G/DL (ref 3.2–4.9)
ALBUMIN/GLOB SERPL: 1.5 G/DL
ALP SERPL-CCNC: 81 U/L (ref 30–99)
ALT SERPL-CCNC: 34 U/L (ref 2–50)
ANION GAP SERPL CALC-SCNC: 8 MMOL/L (ref 0–11.9)
AST SERPL-CCNC: 20 U/L (ref 12–45)
BASOPHILS # BLD AUTO: 0.5 % (ref 0–1.8)
BASOPHILS # BLD: 0.04 K/UL (ref 0–0.12)
BILIRUB SERPL-MCNC: 0.3 MG/DL (ref 0.1–1.5)
BUN SERPL-MCNC: 17 MG/DL (ref 8–22)
CALCIUM SERPL-MCNC: 9.7 MG/DL (ref 8.5–10.5)
CHLORIDE SERPL-SCNC: 107 MMOL/L (ref 96–112)
CHOLEST SERPL-MCNC: 135 MG/DL (ref 100–199)
CO2 SERPL-SCNC: 29 MMOL/L (ref 20–33)
CREAT SERPL-MCNC: 0.7 MG/DL (ref 0.5–1.4)
EOSINOPHIL # BLD AUTO: 0.4 K/UL (ref 0–0.51)
EOSINOPHIL NFR BLD: 5.3 % (ref 0–6.9)
ERYTHROCYTE [DISTWIDTH] IN BLOOD BY AUTOMATED COUNT: 40.2 FL (ref 35.9–50)
FASTING STATUS PATIENT QL REPORTED: NORMAL
GLOBULIN SER CALC-MCNC: 3 G/DL (ref 1.9–3.5)
GLUCOSE SERPL-MCNC: 100 MG/DL (ref 65–99)
HCT VFR BLD AUTO: 44.4 % (ref 37–47)
HDLC SERPL-MCNC: 42 MG/DL
HGB BLD-MCNC: 14.9 G/DL (ref 12–16)
IMM GRANULOCYTES # BLD AUTO: 0.02 K/UL (ref 0–0.11)
IMM GRANULOCYTES NFR BLD AUTO: 0.3 % (ref 0–0.9)
LDLC SERPL CALC-MCNC: 78 MG/DL
LYMPHOCYTES # BLD AUTO: 1.87 K/UL (ref 1–4.8)
LYMPHOCYTES NFR BLD: 24.6 % (ref 22–41)
MCH RBC QN AUTO: 30 PG (ref 27–33)
MCHC RBC AUTO-ENTMCNC: 33.6 G/DL (ref 33.6–35)
MCV RBC AUTO: 89.3 FL (ref 81.4–97.8)
MONOCYTES # BLD AUTO: 0.39 K/UL (ref 0–0.85)
MONOCYTES NFR BLD AUTO: 5.1 % (ref 0–13.4)
NEUTROPHILS # BLD AUTO: 4.89 K/UL (ref 2–7.15)
NEUTROPHILS NFR BLD: 64.2 % (ref 44–72)
NRBC # BLD AUTO: 0 K/UL
NRBC BLD-RTO: 0 /100 WBC
PLATELET # BLD AUTO: 272 K/UL (ref 164–446)
PMV BLD AUTO: 9.8 FL (ref 9–12.9)
POTASSIUM SERPL-SCNC: 3.7 MMOL/L (ref 3.6–5.5)
PROT SERPL-MCNC: 7.5 G/DL (ref 6–8.2)
RBC # BLD AUTO: 4.97 M/UL (ref 4.2–5.4)
SODIUM SERPL-SCNC: 144 MMOL/L (ref 135–145)
TRIGL SERPL-MCNC: 74 MG/DL (ref 0–149)
WBC # BLD AUTO: 7.6 K/UL (ref 4.8–10.8)

## 2019-10-11 PROCEDURE — 80053 COMPREHEN METABOLIC PANEL: CPT

## 2019-10-11 PROCEDURE — 36415 COLL VENOUS BLD VENIPUNCTURE: CPT

## 2019-10-11 PROCEDURE — 82306 VITAMIN D 25 HYDROXY: CPT

## 2019-10-11 PROCEDURE — 80061 LIPID PANEL: CPT

## 2019-10-11 PROCEDURE — 85025 COMPLETE CBC W/AUTO DIFF WBC: CPT

## 2019-10-15 ENCOUNTER — OFFICE VISIT (OUTPATIENT)
Dept: MEDICAL GROUP | Facility: PHYSICIAN GROUP | Age: 59
End: 2019-10-15
Payer: COMMERCIAL

## 2019-10-15 VITALS
BODY MASS INDEX: 35.7 KG/M2 | HEART RATE: 64 BPM | DIASTOLIC BLOOD PRESSURE: 80 MMHG | SYSTOLIC BLOOD PRESSURE: 116 MMHG | HEIGHT: 62 IN | TEMPERATURE: 97.3 F | OXYGEN SATURATION: 96 % | RESPIRATION RATE: 16 BRPM | WEIGHT: 194 LBS

## 2019-10-15 DIAGNOSIS — F33.9 EPISODE OF RECURRENT MAJOR DEPRESSIVE DISORDER, UNSPECIFIED DEPRESSION EPISODE SEVERITY (HCC): ICD-10-CM

## 2019-10-15 DIAGNOSIS — I10 ESSENTIAL HYPERTENSION: Chronic | ICD-10-CM

## 2019-10-15 DIAGNOSIS — Z12.39 BREAST CANCER SCREENING: ICD-10-CM

## 2019-10-15 DIAGNOSIS — E66.9 CLASS 1 OBESITY WITHOUT SERIOUS COMORBIDITY WITH BODY MASS INDEX (BMI) OF 34.0 TO 34.9 IN ADULT, UNSPECIFIED OBESITY TYPE: ICD-10-CM

## 2019-10-15 DIAGNOSIS — F41.9 ANXIETY: ICD-10-CM

## 2019-10-15 DIAGNOSIS — M17.12 PRIMARY OSTEOARTHRITIS OF LEFT KNEE: ICD-10-CM

## 2019-10-15 DIAGNOSIS — L30.1 ECZEMA, DYSHIDROTIC: ICD-10-CM

## 2019-10-15 PROCEDURE — 99214 OFFICE O/P EST MOD 30 MIN: CPT | Performed by: INTERNAL MEDICINE

## 2019-10-15 RX ORDER — TERBINAFINE HYDROCHLORIDE 250 MG/1
250 TABLET ORAL DAILY
Qty: 30 TAB | Refills: 1 | Status: SHIPPED | OUTPATIENT
Start: 2019-10-15 | End: 2020-08-24

## 2019-10-15 RX ORDER — TERBINAFINE HYDROCHLORIDE 250 MG/1
250 TABLET ORAL DAILY
COMMUNITY
End: 2019-10-15 | Stop reason: SDUPTHER

## 2019-10-15 RX ORDER — LOSARTAN POTASSIUM 50 MG/1
50 TABLET ORAL DAILY
Qty: 90 TAB | Refills: 3 | Status: SHIPPED | OUTPATIENT
Start: 2019-10-15 | End: 2020-08-24 | Stop reason: SDUPTHER

## 2019-10-15 NOTE — PROGRESS NOTES
Chief Complaint   Patient presents with   • Labs Only     lab results    • Tinea Pedis     pt would like refill of terbinafine    • Medication Management     lisinopril 50mg instead of 100mg       HISTORY OF PRESENT ILLNESS: Patient is a 58 y.o. female established patient who presents today to discuss the medical issues below.    Eczema, dyshidrotic  Patient is concerned about possible fungal infection of the left foot.  She had some left over terbinafine x 2 mos and that has helped.      Essential hypertension  Patient is continuing on the losartan 100 mg 1/2 tab a day.  She wants the 50 mg tablet.      Obesity  Patient has lost about 5#,  She is working on diet.     Anxiety  Patient able to identify she doesn't do well with sugar.      MDD (major depressive disorder)  Continues on the prozac, states still up and down.  Last exacerbation abut 2 weeks ago.  Patient did see local counselor but didn't want to follow up.  Didn't feel too helpful.      Primary osteoarthritis of left knee  Patient has seen [facility], has had MRI and is scheduled for consult for knee endoscopy.        Patient Active Problem List    Diagnosis Date Noted   • Obesity 09/30/2013     Priority: Medium   • Essential hypertension 02/05/2013     Priority: Medium   • Hypercholesterolemia 02/05/2013     Priority: Medium   • MDD (major depressive disorder) 01/29/2013     Priority: Medium   • Primary osteoarthritis of left knee 03/21/2019   • Blood in stool 09/19/2018   • Incomplete right bundle branch block (RBBB) determined by electrocardiography 04/27/2018   • History of sciatica 03/30/2017   • Vitamin D deficiency 12/31/2015   • Fatigue 12/31/2015   • Eczema, dyshidrotic 11/12/2013   • SNORING DISORDER 02/20/2013   • Anxiety 01/29/2013       Allergies:Erythromycin and Lisinopril    Current Outpatient Medications   Medication Sig Dispense Refill   • terbinafine (LAMISIL) 250 MG Tab Take 1 Tab by mouth every day. 30 Tab 1   • losartan (COZAAR) 50 MG Tab  Take 1 Tab by mouth every day. 90 Tab 3   • vitamin D, Ergocalciferol, (DRISDOL) 17771 units Cap capsule  TAKE 1 CAPSULE BY MOUTH 3 TIMES PER WEEK 12 Cap 3   • hydroCHLOROthiazide (HYDRODIURIL) 12.5 MG tablet TAKE 1 TABLET BY MOUTH ONCE DAILY STOP  25MG  DOSE 90 Tab 3   • meloxicam (MOBIC) 15 MG tablet Take 15 mg by mouth every day.     • atorvastatin (LIPITOR) 10 MG Tab TAKE ONE TABLET BY MOUTH ONCE DAILY 90 Tab 3   • FLUoxetine (PROZAC) 20 MG Cap TAKE 1 CAPSULE BY MOUTH ONCE DAILY 90 Cap 3   • aspirin (ASA) 81 MG Chew Tab chewable tablet Take 1 Tab by mouth every day. 100 Tab 10     No current facility-administered medications for this visit.          Past Medical History:   Diagnosis Date   • depression     taking prozac,wellbutrin   • History of sciatica 3/30/2017   • Hyperlipidemia    • Hypertension    • SURI (obstructive sleep apnea)    • Other specified symptom associated with female genital organs        Social History     Tobacco Use   • Smoking status: Former Smoker     Last attempt to quit: 1989     Years since quittin.8   • Smokeless tobacco: Never Used   • Tobacco comment: 1 ppd 7 yrs,quit    Substance Use Topics   • Alcohol use: Yes     Alcohol/week: 7.0 oz     Types: 14 Standard drinks or equivalent per week     Comment: rare   • Drug use: No     Comment: remote heavy cocaine usage        Family Status   Relation Name Status   • Mo  (Not Specified)   • Fa  (Not Specified)   • Bro  (Not Specified)   • PGMo  (Not Specified)     Family History   Problem Relation Age of Onset   • Hypertension Mother    • Stroke Mother 74   • Hypertension Father    • Stroke Father 65        tia   • Diabetes Brother         DMII   • Heart Disease Paternal Grandmother 55        fatal - no details available       ROS:    Respiratory: Negative for cough, sputum production, shortness of breath or wheezing.    Cardiovascular: Negative for chest pain, palpitations, orthopnea, dyspnea with exertion or edema.  "  Gastrointestinal: Negative for GI upset, nausea, vomiting, abdominal pain, constipation or diarrhea.   Genitourinary: Negative for dysuria, urgency, hesitancy or frequency.       Exam:    /80 (BP Location: Left arm, Patient Position: Sitting, BP Cuff Size: Large adult)   Pulse 64   Temp 36.3 °C (97.3 °F) (Temporal)   Resp 16   Ht 1.575 m (5' 2\")   Wt 88 kg (194 lb)   SpO2 96%   General:  Well nourished, well developed female in NAD.  HENT: Normocephalic, bilateral TMs are intact, nasal and oral mucosa with no lesions,   Neck: Supple without bruit. Thyroid is not enlarged.  Pulmonary: Clear to ausculation and percussion.  Normal effort. No rales, rhonchi, or wheezing.  Cardiovascular: Regular rate and rhythm without murmur.   Abdomen: Normal bowel sounds soft and nontender no palpable liver spleen bladder mass.  Extremities: No LE edema noted.  Neuro: Grossly nonfocal.  Psych: Alert and oriented to person, place, and time. Appropriate mood and conversation.    LABS: Results reviewed and discussed with the patient, questions answered.      This dictation was created using voice recognition software. I have made reasonable attempts to correct errors, however, errors of grammar and content may exist.          Assessment/Plan:    1. Eczema, dyshidrotic  Monitor with course of her benefit.  Discussed hydrating measures and skin scrubs for feet    2. Essential hypertension  Controlled no change for now may be able to taper and discontinue the hydrochlorothiazide however monitor for weight stability first  - Comp Metabolic Panel; Future  - CBC WITH DIFFERENTIAL; Future    3. Class 1 obesity without serious comorbidity with body mass index (BMI) of 34.0 to 34.9 in adult, unspecified obesity type  Discussed diet, exercise, weight loss, behavioral modification, portion size management.      4. Anxiety  Fairly stable support given not currently doing counseling.  Continuing on the Prozac    5. Episode of recurrent " major depressive disorder, unspecified depression episode severity (HCC)  Above remaining stable not currently in crisis encouraged some modification insight issue addressing.  Support.    6. Primary osteoarthritis of left knee  Following with renal orthopedic most likely meniscal tear    7. Breast cancer screening  Reviewed recommendations  - MA-SCREEN MAMMO W/CAD-BILAT; Future    Patient was seen for 25 minutes face to face of which more than 50% of the time was spent in counseling and coordination of care regarding the above problems.

## 2019-10-15 NOTE — ASSESSMENT & PLAN NOTE
Patient is concerned about possible fungal infection of the left foot.  She had some left over terbinafine x 2 mos and that has helped.

## 2019-10-15 NOTE — ASSESSMENT & PLAN NOTE
Continues on the prozac, states still up and down.  Last exacerbation abut 2 weeks ago.  Patient did see local counselor but didn't want to follow up.  Didn't feel too helpful.

## 2019-12-02 DIAGNOSIS — Z01.812 PRE-OPERATIVE LABORATORY EXAMINATION: ICD-10-CM

## 2019-12-02 DIAGNOSIS — Z01.810 PRE-OPERATIVE CARDIOVASCULAR EXAMINATION: ICD-10-CM

## 2019-12-02 LAB
ANION GAP SERPL CALC-SCNC: 9 MMOL/L (ref 0–11.9)
BUN SERPL-MCNC: 19 MG/DL (ref 8–22)
CALCIUM SERPL-MCNC: 9.3 MG/DL (ref 8.5–10.5)
CHLORIDE SERPL-SCNC: 106 MMOL/L (ref 96–112)
CO2 SERPL-SCNC: 28 MMOL/L (ref 20–33)
CREAT SERPL-MCNC: 0.73 MG/DL (ref 0.5–1.4)
EKG IMPRESSION: NORMAL
GLUCOSE SERPL-MCNC: 109 MG/DL (ref 65–99)
POTASSIUM SERPL-SCNC: 3.4 MMOL/L (ref 3.6–5.5)
SODIUM SERPL-SCNC: 143 MMOL/L (ref 135–145)

## 2019-12-02 PROCEDURE — 80048 BASIC METABOLIC PNL TOTAL CA: CPT

## 2019-12-02 PROCEDURE — 36415 COLL VENOUS BLD VENIPUNCTURE: CPT

## 2019-12-02 PROCEDURE — 93005 ELECTROCARDIOGRAM TRACING: CPT

## 2019-12-02 PROCEDURE — 93010 ELECTROCARDIOGRAM REPORT: CPT | Performed by: INTERNAL MEDICINE

## 2019-12-02 SDOH — HEALTH STABILITY: MENTAL HEALTH: HOW OFTEN DO YOU HAVE A DRINK CONTAINING ALCOHOL?: 2-3 TIMES A WEEK

## 2019-12-02 NOTE — OR NURSING
"Preadmit appointment: \" Preparing for your Procedure information\" sheet given to patient with verbal and written instructions. Patient instructed to continue prescribed medications through the day before surgery, instructed to take the following medications the day of surgery per anesthesia protocol:  NONE.   Pt states, \"no issues with anesthesia\".  Anesthesia Fasting guidelines handout given to Pt.   All Pt's questions and concerns answered or addressed.  "

## 2019-12-03 ENCOUNTER — ANESTHESIA (OUTPATIENT)
Dept: SURGERY | Facility: MEDICAL CENTER | Age: 59
End: 2019-12-03
Payer: COMMERCIAL

## 2019-12-03 ENCOUNTER — HOSPITAL ENCOUNTER (OUTPATIENT)
Facility: MEDICAL CENTER | Age: 59
End: 2019-12-03
Attending: ORTHOPAEDIC SURGERY | Admitting: ORTHOPAEDIC SURGERY
Payer: COMMERCIAL

## 2019-12-03 ENCOUNTER — ANESTHESIA EVENT (OUTPATIENT)
Dept: SURGERY | Facility: MEDICAL CENTER | Age: 59
End: 2019-12-03
Payer: COMMERCIAL

## 2019-12-03 VITALS
RESPIRATION RATE: 18 BRPM | HEART RATE: 78 BPM | WEIGHT: 201.94 LBS | SYSTOLIC BLOOD PRESSURE: 146 MMHG | DIASTOLIC BLOOD PRESSURE: 76 MMHG | TEMPERATURE: 97.9 F | BODY MASS INDEX: 37.16 KG/M2 | HEIGHT: 62 IN | OXYGEN SATURATION: 94 %

## 2019-12-03 PROCEDURE — 160036 HCHG PACU - EA ADDL 30 MINS PHASE I: Performed by: ORTHOPAEDIC SURGERY

## 2019-12-03 PROCEDURE — 160025 RECOVERY II MINUTES (STATS): Performed by: ORTHOPAEDIC SURGERY

## 2019-12-03 PROCEDURE — 160002 HCHG RECOVERY MINUTES (STAT): Performed by: ORTHOPAEDIC SURGERY

## 2019-12-03 PROCEDURE — 501838 HCHG SUTURE GENERAL: Performed by: ORTHOPAEDIC SURGERY

## 2019-12-03 PROCEDURE — 160029 HCHG SURGERY MINUTES - 1ST 30 MINS LEVEL 4: Performed by: ORTHOPAEDIC SURGERY

## 2019-12-03 PROCEDURE — 700105 HCHG RX REV CODE 258: Performed by: ANESTHESIOLOGY

## 2019-12-03 PROCEDURE — 160046 HCHG PACU - 1ST 60 MINS PHASE II: Performed by: ORTHOPAEDIC SURGERY

## 2019-12-03 PROCEDURE — 160048 HCHG OR STATISTICAL LEVEL 1-5: Performed by: ORTHOPAEDIC SURGERY

## 2019-12-03 PROCEDURE — 160035 HCHG PACU - 1ST 60 MINS PHASE I: Performed by: ORTHOPAEDIC SURGERY

## 2019-12-03 PROCEDURE — 700105 HCHG RX REV CODE 258: Performed by: ORTHOPAEDIC SURGERY

## 2019-12-03 PROCEDURE — 700102 HCHG RX REV CODE 250 W/ 637 OVERRIDE(OP)

## 2019-12-03 PROCEDURE — 160009 HCHG ANES TIME/MIN: Performed by: ORTHOPAEDIC SURGERY

## 2019-12-03 PROCEDURE — 700101 HCHG RX REV CODE 250: Performed by: ORTHOPAEDIC SURGERY

## 2019-12-03 PROCEDURE — A9270 NON-COVERED ITEM OR SERVICE: HCPCS | Performed by: ANESTHESIOLOGY

## 2019-12-03 PROCEDURE — 502580 HCHG PACK, KNEE ARTHROSCOPY: Performed by: ORTHOPAEDIC SURGERY

## 2019-12-03 PROCEDURE — 700111 HCHG RX REV CODE 636 W/ 250 OVERRIDE (IP): Performed by: ORTHOPAEDIC SURGERY

## 2019-12-03 PROCEDURE — 160041 HCHG SURGERY MINUTES - EA ADDL 1 MIN LEVEL 4: Performed by: ORTHOPAEDIC SURGERY

## 2019-12-03 PROCEDURE — 700111 HCHG RX REV CODE 636 W/ 250 OVERRIDE (IP): Performed by: ANESTHESIOLOGY

## 2019-12-03 PROCEDURE — 700101 HCHG RX REV CODE 250: Performed by: ANESTHESIOLOGY

## 2019-12-03 PROCEDURE — A9270 NON-COVERED ITEM OR SERVICE: HCPCS

## 2019-12-03 PROCEDURE — 700102 HCHG RX REV CODE 250 W/ 637 OVERRIDE(OP): Performed by: ANESTHESIOLOGY

## 2019-12-03 PROCEDURE — 700111 HCHG RX REV CODE 636 W/ 250 OVERRIDE (IP)

## 2019-12-03 RX ORDER — LABETALOL HYDROCHLORIDE 5 MG/ML
5 INJECTION, SOLUTION INTRAVENOUS
Status: DISCONTINUED | OUTPATIENT
Start: 2019-12-03 | End: 2019-12-03 | Stop reason: HOSPADM

## 2019-12-03 RX ORDER — HYDRALAZINE HYDROCHLORIDE 20 MG/ML
5 INJECTION INTRAMUSCULAR; INTRAVENOUS
Status: DISCONTINUED | OUTPATIENT
Start: 2019-12-03 | End: 2019-12-03 | Stop reason: HOSPADM

## 2019-12-03 RX ORDER — HYDROMORPHONE HYDROCHLORIDE 1 MG/ML
0.1 INJECTION, SOLUTION INTRAMUSCULAR; INTRAVENOUS; SUBCUTANEOUS
Status: DISCONTINUED | OUTPATIENT
Start: 2019-12-03 | End: 2019-12-03 | Stop reason: HOSPADM

## 2019-12-03 RX ORDER — HYDROMORPHONE HYDROCHLORIDE 1 MG/ML
0.4 INJECTION, SOLUTION INTRAMUSCULAR; INTRAVENOUS; SUBCUTANEOUS
Status: DISCONTINUED | OUTPATIENT
Start: 2019-12-03 | End: 2019-12-03 | Stop reason: HOSPADM

## 2019-12-03 RX ORDER — HYDROMORPHONE HYDROCHLORIDE 1 MG/ML
0.2 INJECTION, SOLUTION INTRAMUSCULAR; INTRAVENOUS; SUBCUTANEOUS
Status: DISCONTINUED | OUTPATIENT
Start: 2019-12-03 | End: 2019-12-03 | Stop reason: HOSPADM

## 2019-12-03 RX ORDER — LIDOCAINE HYDROCHLORIDE 20 MG/ML
INJECTION, SOLUTION EPIDURAL; INFILTRATION; INTRACAUDAL; PERINEURAL PRN
Status: DISCONTINUED | OUTPATIENT
Start: 2019-12-03 | End: 2019-12-03 | Stop reason: SURG

## 2019-12-03 RX ORDER — GABAPENTIN 300 MG/1
300 CAPSULE ORAL ONCE
Status: COMPLETED | OUTPATIENT
Start: 2019-12-03 | End: 2019-12-03

## 2019-12-03 RX ORDER — EPINEPHRINE 1 MG/ML(1)
AMPUL (ML) INJECTION
Status: DISCONTINUED | OUTPATIENT
Start: 2019-12-03 | End: 2019-12-03 | Stop reason: HOSPADM

## 2019-12-03 RX ORDER — OXYCODONE HCL 5 MG/5 ML
SOLUTION, ORAL ORAL
Status: COMPLETED
Start: 2019-12-03 | End: 2019-12-03

## 2019-12-03 RX ORDER — SODIUM CHLORIDE, SODIUM LACTATE, POTASSIUM CHLORIDE, CALCIUM CHLORIDE 600; 310; 30; 20 MG/100ML; MG/100ML; MG/100ML; MG/100ML
INJECTION, SOLUTION INTRAVENOUS CONTINUOUS
Status: DISCONTINUED | OUTPATIENT
Start: 2019-12-03 | End: 2019-12-03 | Stop reason: HOSPADM

## 2019-12-03 RX ORDER — OXYCODONE HCL 5 MG/5 ML
10 SOLUTION, ORAL ORAL
Status: COMPLETED | OUTPATIENT
Start: 2019-12-03 | End: 2019-12-03

## 2019-12-03 RX ORDER — HALOPERIDOL 5 MG/ML
1 INJECTION INTRAMUSCULAR
Status: DISCONTINUED | OUTPATIENT
Start: 2019-12-03 | End: 2019-12-03 | Stop reason: HOSPADM

## 2019-12-03 RX ORDER — ROPIVACAINE HYDROCHLORIDE 5 MG/ML
INJECTION, SOLUTION EPIDURAL; INFILTRATION; PERINEURAL
Status: DISCONTINUED | OUTPATIENT
Start: 2019-12-03 | End: 2019-12-03 | Stop reason: HOSPADM

## 2019-12-03 RX ORDER — OXYCODONE HCL 5 MG/5 ML
5 SOLUTION, ORAL ORAL
Status: COMPLETED | OUTPATIENT
Start: 2019-12-03 | End: 2019-12-03

## 2019-12-03 RX ORDER — ONDANSETRON 2 MG/ML
INJECTION INTRAMUSCULAR; INTRAVENOUS PRN
Status: DISCONTINUED | OUTPATIENT
Start: 2019-12-03 | End: 2019-12-03 | Stop reason: SURG

## 2019-12-03 RX ORDER — SODIUM CHLORIDE, SODIUM LACTATE, POTASSIUM CHLORIDE, CALCIUM CHLORIDE 600; 310; 30; 20 MG/100ML; MG/100ML; MG/100ML; MG/100ML
INJECTION, SOLUTION INTRAVENOUS
Status: DISCONTINUED | OUTPATIENT
Start: 2019-12-03 | End: 2019-12-03 | Stop reason: SURG

## 2019-12-03 RX ORDER — DIPHENHYDRAMINE HYDROCHLORIDE 50 MG/ML
12.5 INJECTION INTRAMUSCULAR; INTRAVENOUS
Status: DISCONTINUED | OUTPATIENT
Start: 2019-12-03 | End: 2019-12-03 | Stop reason: HOSPADM

## 2019-12-03 RX ORDER — DEXAMETHASONE SODIUM PHOSPHATE 4 MG/ML
INJECTION, SOLUTION INTRA-ARTICULAR; INTRALESIONAL; INTRAMUSCULAR; INTRAVENOUS; SOFT TISSUE PRN
Status: DISCONTINUED | OUTPATIENT
Start: 2019-12-03 | End: 2019-12-03 | Stop reason: SURG

## 2019-12-03 RX ORDER — CEFAZOLIN SODIUM 1 G/3ML
INJECTION, POWDER, FOR SOLUTION INTRAMUSCULAR; INTRAVENOUS PRN
Status: DISCONTINUED | OUTPATIENT
Start: 2019-12-03 | End: 2019-12-03 | Stop reason: SURG

## 2019-12-03 RX ORDER — CELECOXIB 200 MG/1
200 CAPSULE ORAL ONCE
Status: COMPLETED | OUTPATIENT
Start: 2019-12-03 | End: 2019-12-03

## 2019-12-03 RX ORDER — ACETAMINOPHEN 500 MG
1000 TABLET ORAL ONCE
Status: COMPLETED | OUTPATIENT
Start: 2019-12-03 | End: 2019-12-03

## 2019-12-03 RX ORDER — ONDANSETRON 2 MG/ML
4 INJECTION INTRAMUSCULAR; INTRAVENOUS
Status: DISCONTINUED | OUTPATIENT
Start: 2019-12-03 | End: 2019-12-03 | Stop reason: HOSPADM

## 2019-12-03 RX ADMIN — ACETAMINOPHEN 1000 MG: 500 TABLET, FILM COATED ORAL at 13:32

## 2019-12-03 RX ADMIN — CEFAZOLIN 2 G: 1 INJECTION, POWDER, FOR SOLUTION INTRAVENOUS at 14:17

## 2019-12-03 RX ADMIN — SODIUM CHLORIDE, POTASSIUM CHLORIDE, SODIUM LACTATE AND CALCIUM CHLORIDE: 600; 310; 30; 20 INJECTION, SOLUTION INTRAVENOUS at 13:52

## 2019-12-03 RX ADMIN — DEXAMETHASONE SODIUM PHOSPHATE 8 MG: 4 INJECTION, SOLUTION INTRAMUSCULAR; INTRAVENOUS at 14:21

## 2019-12-03 RX ADMIN — OXYCODONE HYDROCHLORIDE 5 MG: 5 SOLUTION ORAL at 15:01

## 2019-12-03 RX ADMIN — PROPOFOL 200 MG: 10 INJECTION, EMULSION INTRAVENOUS at 14:13

## 2019-12-03 RX ADMIN — SODIUM CHLORIDE, POTASSIUM CHLORIDE, SODIUM LACTATE AND CALCIUM CHLORIDE: 600; 310; 30; 20 INJECTION, SOLUTION INTRAVENOUS at 14:07

## 2019-12-03 RX ADMIN — GABAPENTIN 300 MG: 300 CAPSULE ORAL at 13:31

## 2019-12-03 RX ADMIN — FENTANYL CITRATE 25 MCG: 50 INJECTION INTRAMUSCULAR; INTRAVENOUS at 15:01

## 2019-12-03 RX ADMIN — ONDANSETRON 4 MG: 2 INJECTION INTRAMUSCULAR; INTRAVENOUS at 14:21

## 2019-12-03 RX ADMIN — LIDOCAINE HYDROCHLORIDE 100 MG: 20 INJECTION, SOLUTION EPIDURAL; INFILTRATION; INTRACAUDAL; PERINEURAL at 14:13

## 2019-12-03 RX ADMIN — FENTANYL CITRATE 25 MCG: 50 INJECTION, SOLUTION INTRAMUSCULAR; INTRAVENOUS at 14:38

## 2019-12-03 RX ADMIN — Medication 5 MG: at 15:01

## 2019-12-03 RX ADMIN — EPHEDRINE SULFATE 10 MG: 50 INJECTION INTRAMUSCULAR; INTRAVENOUS; SUBCUTANEOUS at 14:17

## 2019-12-03 RX ADMIN — FENTANYL CITRATE 50 MCG: 50 INJECTION, SOLUTION INTRAMUSCULAR; INTRAVENOUS at 14:13

## 2019-12-03 RX ADMIN — CELECOXIB 200 MG: 200 CAPSULE ORAL at 13:32

## 2019-12-03 ASSESSMENT — PAIN SCALES - GENERAL: PAIN_LEVEL: 3

## 2019-12-03 NOTE — ANESTHESIA TIME REPORT
Anesthesia Start and Stop Event Times     Date Time Event    12/3/2019 1359 Ready for Procedure     1407 Anesthesia Start     1450 Anesthesia Stop        Responsible Staff  12/03/19    Name Role Begin End    Harmeet Woodall M.D. Anesth 1407 1450        Preop Diagnosis (Free Text):  Pre-op Diagnosis     KNEE PAIN LEFT, TEAR OF MEDIAL MENISCUS        Preop Diagnosis (Codes):    Post op Diagnosis  Left knee pain      Premium Reason  Non-Premium    Comments:

## 2019-12-03 NOTE — OR NURSING
1449 To PACU from OR via gurney, respirations spontaneous and non-labored. Icepack applied over c/d/i left knee  surgical dressings. +2 pedal pulse to LLE and cap refill < 3 seconds. VSS.  Pt states pain is 6/10. Plan to medicate. See MAR   1500 VSS. Pt retrieved phone from her bag and is texting .   1515 Pt states pain is 5/10 and tolerable. Pt denies nausea and is tolerating sips of water.   1530 VSS. No change   1550 Pt meets criteria for stage two at this time. VSS. Pt states pain is tolerable and denies nausea. Report to MAGDALENA Blake

## 2019-12-03 NOTE — ANESTHESIA POSTPROCEDURE EVALUATION
Patient: Carey Gutierrez    Procedure Summary     Date:  12/03/19 Room / Location:   OR 03 / SURGERY [facility]    Anesthesia Start:  1407 Anesthesia Stop:  1450    Procedures:       ARTHROSCOPY, KNEE (Left Knee)      MENISCECTOMY, KNEE, MEDIAL - PARTIAL AND GARCIA (Knee) Diagnosis:  (KNEE PAIN LEFT, TEAR OF MEDIAL MENISCUS)    Surgeon:  Harmeet Frias M.D. Responsible Provider:  Harmeet Woodall M.D.    Anesthesia Type:  general ASA Status:  2          Final Anesthesia Type: general  Last vitals  BP   Blood Pressure: (!) 164/89    Temp   36.8 °C (98.2 °F)    Pulse   Pulse: (!) 54   Resp   16    SpO2   96 %      Anesthesia Post Evaluation    Patient location during evaluation: PACU  Patient participation: complete - patient participated  Level of consciousness: awake and alert  Pain score: 3    Airway patency: patent  Anesthetic complications: no  Cardiovascular status: hemodynamically stable  Respiratory status: acceptable  Hydration status: euvolemic    PONV: none           Nurse Pain Score: 0 (NPRS)

## 2019-12-03 NOTE — ANESTHESIA PROCEDURE NOTES
Airway  Date/Time: 12/3/2019 2:15 PM  Performed by: Harmeet Woodall M.D.  Authorized by: Harmeet Woodall M.D.     Location:  OR  Urgency:  Elective  Indications for Airway Management:  Anesthesia  Spontaneous Ventilation: absent    Sedation Level:  Deep  Preoxygenated: Yes    Final Airway Type:  Supraglottic airway  Final Supraglottic Airway:  Standard LMA  SGA Size:  4  Number of Attempts at Approach:  1   Place by Ariel DARLING student

## 2019-12-03 NOTE — OR NURSING
Patient to preop, allergies and NPO status verified, home medications reconciled, belongings secured, verbalizes understanding of pain scale, surgical site verified, IV access established, SCDs/ VINNIE hose in place, triple aim completed.

## 2019-12-03 NOTE — DISCHARGE INSTRUCTIONS
ACTIVITY: Rest and take it easy for the first 24 hours.  A responsible adult is recommended to remain with you during that time.  It is normal to feel sleepy.  We encourage you to not do anything that requires balance, judgment or coordination.    MILD FLU-LIKE SYMPTOMS ARE NORMAL. YOU MAY EXPERIENCE GENERALIZED MUSCLE ACHES, THROAT IRRITATION, HEADACHE AND/OR SOME NAUSEA.    FOR 24 HOURS DO NOT:  Drive, operate machinery or run household appliances.  Drink beer or alcoholic beverages.   Make important decisions or sign legal documents.    SPECIAL INSTRUCTIONS: See attached instruction sheet.     DIET: To avoid nausea, slowly advance diet as tolerated, avoiding spicy or greasy foods for the first day.  Add more substantial food to your diet according to your physician's instructions.  Babies can be fed formula or breast milk as soon as they are hungry.  INCREASE FLUIDS AND FIBER TO AVOID CONSTIPATION.    FOLLOW-UP APPOINTMENT:  A follow-up appointment should be arranged with your doctor; call to schedule.    You should CALL YOUR PHYSICIAN if you develop:  Fever greater than 101 degrees F.  Pain not relieved by medication, or persistent nausea or vomiting.  Excessive bleeding (blood soaking through dressing) or unexpected drainage from the wound.  Extreme redness or swelling around the incision site, drainage of pus or foul smelling drainage.  Inability to urinate or empty your bladder within 8 hours.  Problems with breathing or chest pain call 911    You should call 911 if you develop problems with breathing or chest pain.  If you are unable to contact your doctor or surgical center, you should go to the nearest emergency room or urgent care center.      Physician's telephone #: Dr. Frias [phone]    If any questions arise, call your doctor.  If your doctor is not available, please feel free to call the Surgical Center at [phone].  The Center is open Monday through Friday from 7AM to 7PM.  You can also  call the HEALTH HOTLINE open 24 hours/day, 7 days/week and speak to a nurse at [phone], or toll free at [phone].    A registered nurse may call you a few days after your surgery to see how you are doing after your procedure.    MEDICATIONS: Resume taking daily medication.  Take prescribed pain medication with food.  If no medication is prescribed, you may take non-aspirin pain medication if needed.  PAIN MEDICATION CAN BE VERY CONSTIPATING.  Take a stool softener or laxative such as senokot, pericolace, or milk of magnesia if needed.    Prescription given for Preoperatively .  Last pain medication given at 3:00 pm 5 mg oxycodone .    If your physician has prescribed pain medication that includes Acetaminophen (Tylenol), do not take additional Acetaminophen (Tylenol) while taking the prescribed medication.    Depression / Suicide Risk    As you are discharged from this [facility] facility, it is important to learn how to keep safe from harming yourself.    Recognize the warning signs:  · Abrupt changes in personality, positive or negative- including increase in energy   · Giving away possessions  · Change in eating patterns- significant weight changes-  positive or negative  · Change in sleeping patterns- unable to sleep or sleeping all the time   · Unwillingness or inability to communicate  · Depression  · Unusual sadness, discouragement and loneliness  · Talk of wanting to die  · Neglect of personal appearance   · Rebelliousness- reckless behavior  · Withdrawal from people/activities they love  · Confusion- inability to concentrate     If you or a loved one observes any of these behaviors or has concerns about self-harm, here's what you can do:  · Talk about it- your feelings and reasons for harming yourself  · Remove any means that you might use to hurt yourself (examples: pills, rope, extension cords, firearm)  · Get professional help from the community (Mental Health, Substance Abuse,  psychological counseling)  · Do not be alone:Call your Safe Contact- someone whom you trust who will be there for you.  · Call your local CRISIS HOTLINE 451-4415 or 231-823-3674  · Call your local Children's Mobile Crisis Response Team [place] (610) 194-9847 or www.[employer] [place]  · Call the toll free National Suicide Prevention Hotlines   · National Suicide Prevention Lifeline 065-758-EFMM (3689)  [facility] Line Network 800-SUICIDE (899-1841)    Discharge Education for patients on SURI (Obstructive Sleep Apnea) Protocol    Prior to receiving sedation or anesthesia, we screen all patients for Obstructive Sleep Apnea.  During your screening, you were identified as having suspected, but not confirmed Obstructive Sleep Apnea(SURI).    What is Obstructive Sleep Apnea?  Sleep apnea (AP-ne-ah) is a common disorder which involves breathing pauses that occur during sleep.  These can last from 10 seconds to a minute or longer.  Normal breathing resumes often with a loud snort or choking sound.    Sleep apnea occurs in all age groups and both genders but is more common in men and people over 40 years of age.  It has been estimated that as many as 18 million Americans have sleep apnea.  Most people who have sleep apnea don’t know they have it because it only occurs during sleep.  A family member and/or bed partner may first notice the signs of sleep apnea.  Sleep apnea is a chronic (ongoing) condition that disrupts the quality and quantity of your sleep repeatedly throughout the night.  This often results in excessive daytime sleepiness or fatigue during the day.  It may also contribute to high blood pressure, heart problems, and complications following medications used for surgery and procedures.    To establish a definitive diagnosis, further testing from a specialist would be needed.  We recommend that you follow up with your primary care physician.    We recommend that you should be with an adult observer for at  least 24 hours after your sedation/anesthesia.  If you have a CPAP machine, you should wear it during any sleep period (day or night) for the week following your procedure.  We encourage you to sleep on your side or in a sitting position, even with napping.  Lying flat on your back increases the risk of apnea and airway obstruction during your post procedure recovery period.    It is important to prevent over-sedation that could increase your risk for apnea.  Please take all pain medication as directed by your physician.  If you are not getting pain relief, please contact your physician to discuss possible approaches to relieving pain while minimizing medications that can affect your breathing and oxygen levels.      ·

## 2019-12-03 NOTE — ANESTHESIA PREPROCEDURE EVALUATION
Relevant Problems   NEURO   (+) History of sciatica      CARDIAC   (+) Essential hypertension     SURI    SINUS BRADYCARDIA  NONSPECIFIC INTRAVENTRICULAR CONDUCTION DELAY  Compared to ECG 04/27/2018 15:42:48  Intraventricular conduction delay now present  Incomplete right bundle-branch block no longer present  Electronically Signed On 12-2-2019 15:06:32 PST by Rubio Garzon MD      Physical Exam    Airway   Mallampati: II  TM distance: >3 FB  Neck ROM: full       Cardiovascular - normal exam  Rhythm: regular  Rate: normal  (-) murmur     Dental - normal exam         Pulmonary - normal exam  Breath sounds clear to auscultation     Abdominal    Neurological - normal exam                 Anesthesia Plan    ASA 2       Plan - general       Airway plan will be LMA        Induction: intravenous    Postoperative Plan: Postoperative administration of opioids is intended.    Pertinent diagnostic labs and testing reviewed    Informed Consent:    Anesthetic plan and risks discussed with patient.

## 2019-12-04 ENCOUNTER — TELEPHONE (OUTPATIENT)
Dept: MEDICAL GROUP | Facility: PHYSICIAN GROUP | Age: 59
End: 2019-12-04

## 2019-12-04 DIAGNOSIS — R53.82 CHRONIC FATIGUE: ICD-10-CM

## 2019-12-04 DIAGNOSIS — E66.9 CLASS 1 OBESITY WITHOUT SERIOUS COMORBIDITY WITH BODY MASS INDEX (BMI) OF 34.0 TO 34.9 IN ADULT, UNSPECIFIED OBESITY TYPE: ICD-10-CM

## 2019-12-04 NOTE — TELEPHONE ENCOUNTER
VOICEMAIL  1. Caller Name: Carey                      Call Back Number: [phone] (home)     2. Message: Patient LVM stating  needs a new referral for sleep study as she did not have this done last year when we originally placed referral.     3. Patient approves office to leave a detailed voicemail/MyChart message: yes

## 2019-12-04 NOTE — OP REPORT
DATE OF SERVICE:  12/03/2019    SURGEON:  Harmeet Frias MD    ASSISTANT:  Aminta Mason PA-C    ANESTHESIOLOGIST:  Harmeet Woodall MD    PREOPERATIVE DIAGNOSIS:  Left knee medial meniscus tear with a parameniscal   cyst.    POSTOPERATIVE DIAGNOSES:  1.  Left knee medial meniscus tear with a parameniscal cyst.  2.  Left knee synovitis.    PROCEDURES PERFORMED:  1.  Left knee arthroscopy.  2.  Left knee partial medial meniscectomy and parameniscal cyst decompression.  3.  Left knee limited synovectomy.    ANESTHESIA:  General anesthesia.    HISTORY OF PRESENT ILLNESS:  The patient is a very pleasant 58-year-old female   who injured her left knee approximately 1 year ago.  She was diagnosed with a   meniscus tear and a small parameniscal cyst.  She exhausted nonoperative   management.  We discussed surgical intervention.  The patient has been n.p.o.   since midnight.  She is medically cleared for surgery by the anesthesia team.    INFORMED CONSENT:  The patient was informed of the risks, benefits,   alternatives of planned operation.  The risks include but not limited to   bleeding, infection, neurovascular damage, recurrent meniscus tear,   osteoarthritis/cartilage damage, pain, stiffness, DVT, PE, MI, stroke, and   death.  Advanced directives were reviewed.  After answering all questions, the   patient agreed to proceed with planned operation and informed consent form   was signed.    IMPLANTS:  None.    DESCRIPTION OF PROCEDURE:  The patient was identified in the preoperative   holding area.  The correct procedural side and site were identified and   marked.  The patient was then brought to the operating room and transferred to   the operating room table where all bony prominences were well padded.  She   underwent a general anesthesia.    Left knee was then cleaned with several alcohol-soaked gauzes and joint   injected with 30 mL of 1% lidocaine with epinephrine.  The left lower   extremity was  then prepped and draped in A normal standard sterile fashion.    A procedural pause was performed by the operating team.  The procedure, the   patient's identity, operative side, surgical site, and the procedure were all   verified.  The patient was given IV antibiotics prior to incision.    I then began with a diagnostic arthroscopy via standard anteromedial and   anterolateral portals.  There was a small area of grade III changes over the   lateral patellar facet of the central ridge and medial patellar facet appeared   to be in good condition.  There was a small area of grade II change within   the central part of the trochlear groove.  No obvious loose bodies or soft   debris within the medial and lateral gutters.  Examination of the notch   demonstrated some inflammation and synovitis of the infrapatellar fat pad.    The ACL and PCL appeared to be intact.  Examination of the medial compartment   did demonstrate a degenerative horizontal cleavage tear through the very   lateral part of the posterior horn of the medial meniscus, involving the   posterior root attachment.  There was a small adjacent flap tear as well.  On   probing of the horizontal cleavage component.  I did note egress of oily fluid   suggesting the presence of a parameniscal cyst.  There was an area of grade   II changes over the lateral part of the medial femoral condyle.  Examination   of the lateral compartment demonstrated no obvious tears of the lateral   meniscus and no significant cartilage pathology of the lateral femoral condyle   and lateral tibial plateau.    I first performed synovectomy of the infrapatellar fat pad.  I then turned my   attention medially.  Given the medial meniscus tear location and pattern, I   elected to proceed with a partial medial meniscectomy.  Using a combination of   arthroscopic biters and the oscillating suction shaver device, I debrided and   removed the torn and unstable fragments around the horizontal  cleavage tear   of the posterior horn of the medial meniscus involving the posterior root   attachment.  I also once again noted egress of large amount of oily fluid   suggesting decompression of the parameniscal cyst.  After the debridement was   performed, approximately 60% of the posterior root remained intact and was   stable on probing.  The remaining rim of the posterior horn was about 6 mm.  I   then scanned the entire knee joint again including modified Gillquist   maneuvers to confirm there were no remaining loose bodies or soft tissue   debris.  Meticulous hemostasis obtained.  All instruments were then removed.    The portals were then closed with simple 3-0 Prolene suture followed by   Steri-Strips and Xeroform.  Knee was injected with 30 mL of 0.5% ropivacaine.    A sterile compressive dressing was applied followed by VINNIE hose stocking.    Needle and sponge counts were correct at the end of the procedure as reported   by the circulating nurse.  The patient tolerated the procedure well without   complications.  The patient was then transferred off the operating room table   onto the regular hospital bed.  She was extubated by the anesthesia team.    ESTIMATED BLOOD LOSS:  5 mL    COMPLICATIONS:  None.    TOURNIQUET TIME:  None.    SPECIMENS:  None.    WOUND TYPE:  Type 1, clean.    POSTOPERATIVE PLAN:  The patient will be transferred back to the postoperative   unit.  I expect she will be discharged from the hospital later this afternoon   once mobilizing safely and tolerating all medications.  She will remain   touchdown weightbearing to the left lower extremity with the use of crutches.    We will begin some physical therapy in about 5-7 days.  The patient will take   aspirin for pharmacological DVT prophylaxis.       ____________________________________     MD MIYA Castanon / PETEY    DD:  12/03/2019 14:46:15  DT:  12/03/2019 16:26:36    D#:  [id number]  Job#:  [id number]

## 2019-12-04 NOTE — OR NURSING
1624: D/Axel to care of family post uneventful stay in PACU 2.  Patient has crutches at home. Educated on toe touch weight baring status.

## 2019-12-14 NOTE — PROGRESS NOTES
"  FOLLOW UP VASCULAR VISIT  Subjective:   Carey Gutierrez is a 57 y.o. female who presents today 6/8/18 for   Chief Complaint   Patient presents with   • Follow-Up     Essential  Hypertension     Referred for HTN, HLD mgmt     HPI:    Feeling well.  No current CP or SOB.  Had negative MPI (see below)     HLD: On Atorvastatin, no myalgias.  Last labs stable   HTN: Tolerating Valsartan without dizziness  Home BP:  Low 120s/mid 60s     SURI:  Reports not using CPAP x 2 yrs.  Needs updated PSG.  Currently has fatigue     History   Smoking Status   • Former Smoker   • Quit date: 1/1/1989   Smokeless Tobacco   • Never Used     Comment: 1 ppd 7 yrs,quit 1989     Social History   Substance Use Topics   • Smoking status: Former Smoker     Quit date: 1/1/1989   • Smokeless tobacco: Never Used      Comment: 1 ppd 7 yrs,quit 1989   • Alcohol use 7.0 oz/week     14 Standard drinks or equivalent per week      Comment: rare     DIET AND EXERCISE:  Weight Change: up 3lbs   Diet: trying to control portion size and decrease CHO, will start [facility]   Exercise: , yoga - moderate exercise  Illicit drug use - patient has a distant history of relatively heavy cocaine use    Review of Systems   Constitutional: Negative for malaise/fatigue and weight loss.   Eyes: Negative for blurred vision and double vision.   Respiratory: Negative for cough and shortness of breath.    Cardiovascular: Negative for chest pain, palpitations, orthopnea, claudication and leg swelling.   Gastrointestinal: Negative for blood in stool, heartburn, nausea and vomiting.   Genitourinary: Negative for hematuria.   Musculoskeletal: Negative for falls and myalgias.   Neurological: Negative for dizziness.   Psychiatric/Behavioral: Negative for depression.      Objective:     Vitals:    06/08/18 1431 06/08/18 1438   BP: 133/75 127/72   Pulse: 67 65   Weight: 87.6 kg (193 lb 3.2 oz) 87.5 kg (193 lb)   Height: 1.575 m (5' 2\") 1.575 m (5' " AVS was given to the pt by .    "2\")      BP Readings from Last 4 Encounters:   06/08/18 127/72   04/27/18 118/62   02/21/18 137/86   11/22/17 144/81     Body mass index is 35.3 kg/m².   BMI Readings from Last 4 Encounters:   06/08/18 35.30 kg/m²   04/27/18 34.77 kg/m²   02/21/18 34.97 kg/m²   11/22/17 34.68 kg/m²   ]  Physical Exam   Constitutional: She is oriented to person, place, and time. She appears well-developed and well-nourished. No distress.   HENT:   Head: Normocephalic and atraumatic.   Neck: Normal range of motion. Neck supple.   Cardiovascular: Normal rate, regular rhythm, normal heart sounds and intact distal pulses.  Exam reveals no gallop and no friction rub.    No murmur heard.  Pulses:       Carotid pulses are 2+ on the right side, and 2+ on the left side.       Radial pulses are 2+ on the right side, and 2+ on the left side.        Dorsalis pedis pulses are 2+ on the right side, and 2+ on the left side.        Posterior tibial pulses are 2+ on the right side, and 2+ on the left side.   Pulmonary/Chest: Effort normal and breath sounds normal.   Musculoskeletal: Normal range of motion. She exhibits no edema or tenderness.   Neurological: She is alert and oriented to person, place, and time.   Skin: Skin is warm and dry. She is not diaphoretic.   Psychiatric: She has a normal mood and affect.     Lab Results   Component Value Date    CHOLSTRLTOT 134 01/31/2018    LDL 72 01/31/2018    HDL 45 01/31/2018    TRIGLYCERIDE 87 01/31/2018         Lab Results   Component Value Date    HBA1C 5.5 06/05/2018      Lab Results   Component Value Date    SODIUM 142 03/22/2018    POTASSIUM 3.7 03/22/2018    CHLORIDE 106 03/22/2018    CO2 23 03/22/2018    GLUCOSE 106 (H) 03/22/2018    BUN 24 (H) 03/22/2018    CREATININE 0.85 03/22/2018    IFAFR[place] >60 03/22/2018    IFNOTAFR >60 03/22/2018        Lab Results   Component Value Date    WBC 6.3 03/21/2017    RBC 4.77 03/21/2017    HEMOGLOBIN 14.0 03/21/2017    HEMATOCRIT 41.7 03/21/2017    MCV 87.4 " 03/21/2017    MCH 29.4 03/21/2017    MCHC 33.6 03/21/2017    MPV 10.1 03/21/2017        IMAGING:      EKG Interpretation    Interpreted by me    Rhythm: normal sinus   Rate: bradycardia and 50-60  Axis: normal  Ectopy: none  Conduction: right bundle branch block (incomplete)  ST Segments: nonspecific changes  T Waves: inversion in  v3 and III  Q Waves: none    Clinical Impression: right bundle branch block, incomplete with SB    LEXISCAN 5/1/18   Probable physiologic apical thinning.   No definite myocardial infarct or reversible ischemia.   Normal LEFT ventricular function.   ECG INTERPRETATION   Negative stress ECG for ischemia.    Medical Decision Making:  Today's Assessment / Status / Plan:     1. Essential hypertension  REFERRAL TO NUTRITION SERVICES   2. Incomplete right bundle branch block (RBBB) determined by electrocardiography     3. Dyslipidemia  REFERRAL TO NUTRITION SERVICES   4. Body mass index 35.0-35.9, adult  REFERRAL TO NUTRITION SERVICES   5. Obesity (BMI 35.0-39.9 without comorbidity)  REFERRAL TO NUTRITION SERVICES   6. SURI (obstructive sleep apnea)  REFERRAL TO SLEEP STUDIES     Patient Type: Primary Prevention    Etiology of Established CVD if Present:  None although does have a strong family history of TIA and stroke    Lipid Management:   Qualifies for Statin Therapy Based on 2013 ACC/AHA Guidelines: no  Calculated 10-Year Risk of ASCVD: 2.7%  Currently on Statin: Yes  Patient does not necessarily qualify for statin therapy based on ACC AHA guidelines  However these guidelines do not take into account her family history, her lifetime risk, or her illicit drug use in the distant past and as such may underestimate her risk.  She does seem to tolerate statins well  Goal LDL less than 100 and goal non-HDL less than 130 per national lipid Association guidelines.    At goal:  Yes   - Continue atorvastatin 10 mg daily  - Continue lifestyle modification as per below including weight loss   - Recheck  fasting lipid panel in Jan 2019     Blood Pressure Management:  Blood pressure goal based on ACC/AHA (2017) goal <130/80  Home BP at goal:  yes  Office BP at goal:  yes  - Continue valsartan 160 mg daily  - adjusted to HCTZ 12.5mg, lytes stable      Glycemic Status: PreDM, 6/5/18 A1c = 5.5%   - continue current healthy diet with weight loss  - recheck A1c in 1 yr     Anti-Platelet/Anti-Coagulant Tx:   - continue ASA 81mg     Smoking: Continue complete avoidance    Physical Activity: Continue excellent exercise regimen    Weight Management and Nutrition: Dietary plan was discussed with patient at this visit including going back to low carbohydrate diet with goal of losing 10-20lbs over 6-12mo    Other:     1. Incomplete RBBB with nonspecific T-wave inversions.  Some risk factors for conduction abnormalities due to prior cocaine usage.  - unclear etiology, no current abnl findings on MPI     2. Obstructive sleep apnea. Has stopped CPAP 2 years ago.  Last PSG 2 years ago.    - recommend repeat PSG, prefers [facility], [place], [place]     3) obesity, BMI=35 -- referral to [facility] at [facility] per pt request     Instructed to follow-up with PCP for remainder of adult medical needs: yes  We will partner with other providers in the management of established vascular disease and cardiometabolic risk factors.    Studies to Be Obtained:  None   Labs to Be Obtained:   Lipid, CMP prior to next appt     Follow up in: 6 months      Jack Beatty M.D.     CC: Kierra Barger

## 2020-01-22 ENCOUNTER — APPOINTMENT (RX ONLY)
Dept: URBAN - NONMETROPOLITAN AREA CLINIC 15 | Facility: CLINIC | Age: 60
Setting detail: DERMATOLOGY
End: 2020-01-22

## 2020-01-22 DIAGNOSIS — L82.1 OTHER SEBORRHEIC KERATOSIS: ICD-10-CM

## 2020-01-22 DIAGNOSIS — B35.3 TINEA PEDIS: ICD-10-CM

## 2020-01-22 DIAGNOSIS — L81.4 OTHER MELANIN HYPERPIGMENTATION: ICD-10-CM

## 2020-01-22 DIAGNOSIS — L85.3 XEROSIS CUTIS: ICD-10-CM

## 2020-01-22 DIAGNOSIS — D22 MELANOCYTIC NEVI: ICD-10-CM

## 2020-01-22 DIAGNOSIS — D18.0 HEMANGIOMA: ICD-10-CM

## 2020-01-22 PROBLEM — D22.62 MELANOCYTIC NEVI OF LEFT UPPER LIMB, INCLUDING SHOULDER: Status: ACTIVE | Noted: 2020-01-22

## 2020-01-22 PROBLEM — D22.61 MELANOCYTIC NEVI OF RIGHT UPPER LIMB, INCLUDING SHOULDER: Status: ACTIVE | Noted: 2020-01-22

## 2020-01-22 PROBLEM — D18.01 HEMANGIOMA OF SKIN AND SUBCUTANEOUS TISSUE: Status: ACTIVE | Noted: 2020-01-22

## 2020-01-22 PROBLEM — D22.72 MELANOCYTIC NEVI OF LEFT LOWER LIMB, INCLUDING HIP: Status: ACTIVE | Noted: 2020-01-22

## 2020-01-22 PROBLEM — D22.71 MELANOCYTIC NEVI OF RIGHT LOWER LIMB, INCLUDING HIP: Status: ACTIVE | Noted: 2020-01-22

## 2020-01-22 PROBLEM — D22.5 MELANOCYTIC NEVI OF TRUNK: Status: ACTIVE | Noted: 2020-01-22

## 2020-01-22 PROCEDURE — ? PATIENT SPECIFIC COUNSELING

## 2020-01-22 PROCEDURE — ? LIQUID NITROGEN

## 2020-01-22 PROCEDURE — 99203 OFFICE O/P NEW LOW 30 MIN: CPT

## 2020-01-22 PROCEDURE — ? COUNSELING

## 2020-01-22 PROCEDURE — ? ADDITIONAL NOTES

## 2020-01-22 ASSESSMENT — LOCATION DETAILED DESCRIPTION DERM
LOCATION DETAILED: MIDDLE STERNUM
LOCATION DETAILED: RIGHT POPLITEAL SKIN
LOCATION DETAILED: RIGHT DISTAL POSTERIOR THIGH
LOCATION DETAILED: INFERIOR THORACIC SPINE
LOCATION DETAILED: LEFT PROXIMAL PRETIBIAL REGION
LOCATION DETAILED: RIGHT MEDIAL SUPERIOR CHEST
LOCATION DETAILED: LEFT DISTAL POSTERIOR THIGH
LOCATION DETAILED: LEFT VENTRAL LATERAL PROXIMAL FOREARM
LOCATION DETAILED: LEFT LATERAL PROXIMAL PRETIBIAL REGION
LOCATION DETAILED: RIGHT ANTECUBITAL SKIN
LOCATION DETAILED: LEFT VENTRAL PROXIMAL FOREARM
LOCATION DETAILED: RIGHT INFERIOR CENTRAL MALAR CHEEK
LOCATION DETAILED: RIGHT LATERAL PLANTAR MIDFOOT
LOCATION DETAILED: LEFT MEDIAL PLANTAR MIDFOOT
LOCATION DETAILED: LEFT INFERIOR LATERAL MIDBACK
LOCATION DETAILED: LEFT ANTERIOR DISTAL UPPER ARM
LOCATION DETAILED: RIGHT PROXIMAL PRETIBIAL REGION
LOCATION DETAILED: RIGHT ANTERIOR DISTAL UPPER ARM
LOCATION DETAILED: RIGHT INFERIOR MEDIAL FOREHEAD
LOCATION DETAILED: RIGHT VENTRAL PROXIMAL FOREARM
LOCATION DETAILED: LEFT INFERIOR MEDIAL UPPER BACK
LOCATION DETAILED: SUBXIPHOID
LOCATION DETAILED: LEFT POPLITEAL SKIN
LOCATION DETAILED: LEFT SUPERIOR MEDIAL MIDBACK
LOCATION DETAILED: RIGHT MEDIAL PLANTAR MIDFOOT
LOCATION DETAILED: RIGHT ANTERIOR PROXIMAL UPPER ARM
LOCATION DETAILED: LEFT ANTERIOR PROXIMAL UPPER ARM
LOCATION DETAILED: XIPHOID
LOCATION DETAILED: RIGHT SUPERIOR NASAL CHEEK
LOCATION DETAILED: LEFT PLANTAR FOREFOOT OVERLYING 3RD METATARSAL

## 2020-01-22 ASSESSMENT — LOCATION SIMPLE DESCRIPTION DERM
LOCATION SIMPLE: RIGHT CHEEK
LOCATION SIMPLE: RIGHT FOREHEAD
LOCATION SIMPLE: RIGHT PRETIBIAL REGION
LOCATION SIMPLE: LEFT LOWER BACK
LOCATION SIMPLE: ABDOMEN
LOCATION SIMPLE: RIGHT FOREARM
LOCATION SIMPLE: LEFT PRETIBIAL REGION
LOCATION SIMPLE: RIGHT POSTERIOR THIGH
LOCATION SIMPLE: LEFT PLANTAR SURFACE
LOCATION SIMPLE: LEFT FOREARM
LOCATION SIMPLE: UPPER BACK
LOCATION SIMPLE: RIGHT UPPER ARM
LOCATION SIMPLE: CHEST
LOCATION SIMPLE: LEFT UPPER BACK
LOCATION SIMPLE: LEFT UPPER ARM
LOCATION SIMPLE: RIGHT POPLITEAL SKIN
LOCATION SIMPLE: LEFT POPLITEAL SKIN
LOCATION SIMPLE: LEFT POSTERIOR THIGH
LOCATION SIMPLE: RIGHT PLANTAR SURFACE

## 2020-01-22 ASSESSMENT — LOCATION ZONE DERM
LOCATION ZONE: FACE
LOCATION ZONE: TRUNK
LOCATION ZONE: LEG
LOCATION ZONE: ARM
LOCATION ZONE: FEET

## 2020-01-22 NOTE — PROCEDURE: LIQUID NITROGEN
Post-Care Instructions: I reviewed with the patient in detail post-care instructions. Patient is to wear sunprotection, and avoid picking at any of the treated lesions. Pt may apply Vaseline to crusted or scabbing areas.
Medical Necessity Clause: This procedure was medically necessary because the lesions that were treated were:  If lesion does not resolve, bx is needed.
Medical Necessity Information: It is in your best interest to select a reason for this procedure from the list below. All of these items fulfill various CMS LCD requirements except the new and changing color options.
Render Note In Bullet Format When Appropriate: No
Detail Level: Detailed
Duration Of Freeze Thaw-Cycle (Seconds): 0
Consent: The patient's consent was obtained including but not limited to risks of crusting, scabbing, blistering, scarring, darker or lighter pigmentary change, recurrence, incomplete removal and infection.

## 2020-01-22 NOTE — PROCEDURE: ADDITIONAL NOTES
Additional Notes: Discussed in detail how to eradicate the fungus in showers, shoes, etc.  Spray shoes with antifungal spray and spray bleach in shower.  F/U in 1 month to re-evaluate.  Discussed that eczema can present the same way.
Detail Level: Simple

## 2020-01-22 NOTE — PROCEDURE: PATIENT SPECIFIC COUNSELING
Recommend soaking feet in warm water, then apply bag balm or Vaseline and put a sock on. Try to keep socks on over night or for at least a couple of hours.
Detail Level: Zone

## 2020-04-06 DIAGNOSIS — E78.5 DYSLIPIDEMIA: ICD-10-CM

## 2020-04-06 RX ORDER — ATORVASTATIN CALCIUM 10 MG/1
TABLET, FILM COATED ORAL
Qty: 90 TAB | Refills: 0 | Status: SHIPPED | OUTPATIENT
Start: 2020-04-06 | End: 2020-08-18 | Stop reason: SDUPTHER

## 2020-04-06 NOTE — TELEPHONE ENCOUNTER
S/with pt. Let he know that APRN was able to fill 90day for Atorvastatin, but before any further fills she needs to schedule a f/u.   She stated that her PCP, Charbel, will be managing care of that going forward. She declined to schedule a fu with our office.

## 2020-07-15 ENCOUNTER — HOSPITAL ENCOUNTER (OUTPATIENT)
Dept: LAB | Facility: MEDICAL CENTER | Age: 60
End: 2020-07-15
Attending: INTERNAL MEDICINE
Payer: COMMERCIAL

## 2020-07-15 DIAGNOSIS — I10 ESSENTIAL HYPERTENSION: Chronic | ICD-10-CM

## 2020-07-15 LAB
ALBUMIN SERPL BCP-MCNC: 4.4 G/DL (ref 3.2–4.9)
ALBUMIN/GLOB SERPL: 1.7 G/DL
ALP SERPL-CCNC: 79 U/L (ref 30–99)
ALT SERPL-CCNC: 23 U/L (ref 2–50)
ANION GAP SERPL CALC-SCNC: 13 MMOL/L (ref 7–16)
AST SERPL-CCNC: 15 U/L (ref 12–45)
BASOPHILS # BLD AUTO: 0.7 % (ref 0–1.8)
BASOPHILS # BLD: 0.05 K/UL (ref 0–0.12)
BILIRUB SERPL-MCNC: 0.4 MG/DL (ref 0.1–1.5)
BUN SERPL-MCNC: 16 MG/DL (ref 8–22)
CALCIUM SERPL-MCNC: 9.5 MG/DL (ref 8.5–10.5)
CHLORIDE SERPL-SCNC: 104 MMOL/L (ref 96–112)
CO2 SERPL-SCNC: 25 MMOL/L (ref 20–33)
CREAT SERPL-MCNC: 0.74 MG/DL (ref 0.5–1.4)
EOSINOPHIL # BLD AUTO: 0.24 K/UL (ref 0–0.51)
EOSINOPHIL NFR BLD: 3.3 % (ref 0–6.9)
ERYTHROCYTE [DISTWIDTH] IN BLOOD BY AUTOMATED COUNT: 39.8 FL (ref 35.9–50)
FASTING STATUS PATIENT QL REPORTED: NORMAL
GLOBULIN SER CALC-MCNC: 2.6 G/DL (ref 1.9–3.5)
GLUCOSE SERPL-MCNC: 99 MG/DL (ref 65–99)
HCT VFR BLD AUTO: 42.6 % (ref 37–47)
HGB BLD-MCNC: 14.1 G/DL (ref 12–16)
IMM GRANULOCYTES # BLD AUTO: 0.02 K/UL (ref 0–0.11)
IMM GRANULOCYTES NFR BLD AUTO: 0.3 % (ref 0–0.9)
LYMPHOCYTES # BLD AUTO: 2.24 K/UL (ref 1–4.8)
LYMPHOCYTES NFR BLD: 30.9 % (ref 22–41)
MCH RBC QN AUTO: 29.1 PG (ref 27–33)
MCHC RBC AUTO-ENTMCNC: 33.1 G/DL (ref 33.6–35)
MCV RBC AUTO: 87.8 FL (ref 81.4–97.8)
MONOCYTES # BLD AUTO: 0.36 K/UL (ref 0–0.85)
MONOCYTES NFR BLD AUTO: 5 % (ref 0–13.4)
NEUTROPHILS # BLD AUTO: 4.34 K/UL (ref 2–7.15)
NEUTROPHILS NFR BLD: 59.8 % (ref 44–72)
NRBC # BLD AUTO: 0 K/UL
NRBC BLD-RTO: 0 /100 WBC
PLATELET # BLD AUTO: 269 K/UL (ref 164–446)
PMV BLD AUTO: 9.2 FL (ref 9–12.9)
POTASSIUM SERPL-SCNC: 3.6 MMOL/L (ref 3.6–5.5)
PROT SERPL-MCNC: 7 G/DL (ref 6–8.2)
RBC # BLD AUTO: 4.85 M/UL (ref 4.2–5.4)
SODIUM SERPL-SCNC: 142 MMOL/L (ref 135–145)
WBC # BLD AUTO: 7.3 K/UL (ref 4.8–10.8)

## 2020-07-15 PROCEDURE — 36415 COLL VENOUS BLD VENIPUNCTURE: CPT

## 2020-07-15 PROCEDURE — 80053 COMPREHEN METABOLIC PANEL: CPT

## 2020-07-15 PROCEDURE — 85025 COMPLETE CBC W/AUTO DIFF WBC: CPT

## 2020-08-18 DIAGNOSIS — E78.5 DYSLIPIDEMIA: ICD-10-CM

## 2020-08-18 RX ORDER — HYDROCHLOROTHIAZIDE 12.5 MG/1
12.5 TABLET ORAL DAILY
Qty: 90 TAB | Refills: 0 | Status: SHIPPED | OUTPATIENT
Start: 2020-08-18 | End: 2020-08-24 | Stop reason: SDUPTHER

## 2020-08-18 RX ORDER — ATORVASTATIN CALCIUM 10 MG/1
10 TABLET, FILM COATED ORAL DAILY
Qty: 90 TAB | Refills: 0 | Status: SHIPPED | OUTPATIENT
Start: 2020-08-18 | End: 2020-08-24 | Stop reason: SDUPTHER

## 2020-08-18 NOTE — TELEPHONE ENCOUNTER
Please let the patient know I have written for #90 days, however, she needs OV prior to additional refills.  (she missed last appointment)         emmanuel all pertinent systems normal

## 2020-08-24 ENCOUNTER — OFFICE VISIT (OUTPATIENT)
Dept: MEDICAL GROUP | Facility: PHYSICIAN GROUP | Age: 60
End: 2020-08-24
Payer: COMMERCIAL

## 2020-08-24 VITALS
SYSTOLIC BLOOD PRESSURE: 110 MMHG | TEMPERATURE: 97.7 F | HEIGHT: 62 IN | RESPIRATION RATE: 14 BRPM | OXYGEN SATURATION: 95 % | WEIGHT: 202.1 LBS | DIASTOLIC BLOOD PRESSURE: 80 MMHG | HEART RATE: 65 BPM | BODY MASS INDEX: 37.19 KG/M2

## 2020-08-24 DIAGNOSIS — E55.9 VITAMIN D DEFICIENCY: ICD-10-CM

## 2020-08-24 DIAGNOSIS — R53.82 CHRONIC FATIGUE: ICD-10-CM

## 2020-08-24 DIAGNOSIS — E66.9 CLASS 1 OBESITY WITHOUT SERIOUS COMORBIDITY WITH BODY MASS INDEX (BMI) OF 34.0 TO 34.9 IN ADULT, UNSPECIFIED OBESITY TYPE: ICD-10-CM

## 2020-08-24 DIAGNOSIS — E78.5 DYSLIPIDEMIA: ICD-10-CM

## 2020-08-24 DIAGNOSIS — I10 ESSENTIAL HYPERTENSION: Chronic | ICD-10-CM

## 2020-08-24 DIAGNOSIS — F33.9 EPISODE OF RECURRENT MAJOR DEPRESSIVE DISORDER, UNSPECIFIED DEPRESSION EPISODE SEVERITY (HCC): ICD-10-CM

## 2020-08-24 DIAGNOSIS — E78.00 HYPERCHOLESTEROLEMIA: Chronic | ICD-10-CM

## 2020-08-24 PROCEDURE — 99214 OFFICE O/P EST MOD 30 MIN: CPT | Performed by: INTERNAL MEDICINE

## 2020-08-24 RX ORDER — FLUOXETINE HYDROCHLORIDE 20 MG/1
20 CAPSULE ORAL DAILY
Qty: 90 CAP | Refills: 3 | Status: SHIPPED | OUTPATIENT
Start: 2020-08-24 | End: 2021-09-14

## 2020-08-24 RX ORDER — ERGOCALCIFEROL 1.25 MG/1
50000 CAPSULE ORAL
Qty: 36 CAP | Refills: 3 | Status: SHIPPED | OUTPATIENT
Start: 2020-08-24 | End: 2022-01-10

## 2020-08-24 RX ORDER — ATORVASTATIN CALCIUM 10 MG/1
10 TABLET, FILM COATED ORAL DAILY
Qty: 90 TAB | Refills: 2 | Status: SHIPPED | OUTPATIENT
Start: 2020-08-24 | End: 2021-09-22 | Stop reason: SDUPTHER

## 2020-08-24 RX ORDER — HYDROCHLOROTHIAZIDE 12.5 MG/1
12.5 TABLET ORAL DAILY
Qty: 90 TAB | Refills: 3 | Status: SHIPPED | OUTPATIENT
Start: 2020-08-24 | End: 2021-09-22 | Stop reason: SDUPTHER

## 2020-08-24 RX ORDER — LOSARTAN POTASSIUM 50 MG/1
50 TABLET ORAL DAILY
Qty: 90 TAB | Refills: 3 | Status: SHIPPED | OUTPATIENT
Start: 2020-08-24 | End: 2021-09-14

## 2020-08-24 ASSESSMENT — PATIENT HEALTH QUESTIONNAIRE - PHQ9
5. POOR APPETITE OR OVEREATING: NOT AT ALL
4. FEELING TIRED OR HAVING LITTLE ENERGY: NOT AT ALL
3. TROUBLE FALLING OR STAYING ASLEEP OR SLEEPING TOO MUCH: NOT AT ALL
7. TROUBLE CONCENTRATING ON THINGS, SUCH AS READING THE NEWSPAPER OR WATCHING TELEVISION: NOT AT ALL
8. MOVING OR SPEAKING SO SLOWLY THAT OTHER PEOPLE COULD HAVE NOTICED. OR THE OPPOSITE, BEING SO FIGETY OR RESTLESS THAT YOU HAVE BEEN MOVING AROUND A LOT MORE THAN USUAL: NOT AT ALL
SUM OF ALL RESPONSES TO PHQ QUESTIONS 1-9: 0
6. FEELING BAD ABOUT YOURSELF - OR THAT YOU ARE A FAILURE OR HAVE LET YOURSELF OR YOUR FAMILY DOWN: NOT AL ALL
9. THOUGHTS THAT YOU WOULD BE BETTER OFF DEAD, OR OF HURTING YOURSELF: NOT AT ALL
2. FEELING DOWN, DEPRESSED, IRRITABLE, OR HOPELESS: NOT AT ALL
SUM OF ALL RESPONSES TO PHQ9 QUESTIONS 1 AND 2: 0
1. LITTLE INTEREST OR PLEASURE IN DOING THINGS: NOT AT ALL

## 2020-08-24 ASSESSMENT — FIBROSIS 4 INDEX: FIB4 SCORE: 0.67

## 2020-08-24 NOTE — NON-PROVIDER
C  Chief Complaint   Patient presents with   • Hypertension       HISTORY OF PRESENT ILLNESS: Patient is a 59 y.o. female established patient who presents today to discuss the medical issues below.    Essential hypertension  Home log 128/80. She continues on her HCTZ 12 mg losartan with and tolerating them well.      MDD (major depressive disorder)  Continues to take Prozac and is doing well on it. Pt denies any depression at this time.     Hypercholesterolemia  Continues to take Atorvastatin 10 mg tolerating it well.     Vitamin D deficiency  Pt is taking her Vit D once a week but just increased it to 3 times a wks.     Obesity  Pt states that her wt fluctuates, she has not been follow a very healthy diet and since  COVID she just started to eat more and exercise less.        Patient Active Problem List    Diagnosis Date Noted   • Obesity 09/30/2013     Priority: Medium   • Essential hypertension 02/05/2013     Priority: Medium   • Hypercholesterolemia 02/05/2013     Priority: Medium   • MDD (major depressive disorder) 01/29/2013     Priority: Medium   • Primary osteoarthritis of left knee 03/21/2019   • Blood in stool 09/19/2018   • Incomplete right bundle branch block (RBBB) determined by electrocardiography 04/27/2018   • History of sciatica 03/30/2017   • Vitamin D deficiency 12/31/2015   • Fatigue 12/31/2015   • Eczema, dyshidrotic 11/12/2013   • SNORING DISORDER 02/20/2013   • Anxiety 01/29/2013       Allergies:Erythromycin, Lactose, and Lisinopril    Current Outpatient Medications   Medication Sig Dispense Refill   • atorvastatin (LIPITOR) 10 MG Tab Take 1 Tab by mouth every day. 90 Tab 0   • hydroCHLOROthiazide (HYDRODIURIL) 12.5 MG tablet Take 1 Tab by mouth every day. 90 Tab 0   • FLUoxetine (PROZAC) 20 MG Cap TAKE 1 CAPSULE BY MOUTH ONCE DAILY 90 Cap 3   • losartan (COZAAR) 50 MG Tab Take 1 Tab by mouth every day. 90 Tab 3   • vitamin D, Ergocalciferol, (DRISDOL) 39434 units Cap capsule  TAKE 1 CAPSULE  BY MOUTH 3 TIMES PER WEEK (Patient taking differently: every 7 days.) 12 Cap 3   • aspirin (ASA) 81 MG Chew Tab chewable tablet Take 1 Tab by mouth every day. 100 Tab 10     No current facility-administered medications for this visit.          Past Medical History:   Diagnosis Date   • Arthritis     Osteo to knee   • depression     taking prozac,wellbutrin   • High cholesterol    • History of sciatica 3/30/2017   • Hyperlipidemia    • Hypertension    • SURI (obstructive sleep apnea)     C-PAP sometimes sleep study    • Other specified symptom associated with female genital organs    • Right bundle branch block (RBBB)    • Snoring        Social History     Tobacco Use   • Smoking status: Former Smoker     Packs/day: 0.00     Quit date: 1989     Years since quittin.6   • Smokeless tobacco: Never Used   • Tobacco comment: 1 ppd 7 yrs,quit    Substance Use Topics   • Alcohol use: Yes     Alcohol/week: 7.0 oz     Types: 14 Standard drinks or equivalent per week     Frequency: 2-3 times a week     Comment: rare   • Drug use: No     Comment: remote heavy cocaine usage        Family Status   Relation Name Status   • Mo  (Not Specified)   • Fa  (Not Specified)   • Bro  (Not Specified)   • PGMo  (Not Specified)     Family History   Problem Relation Age of Onset   • Hypertension Mother    • Stroke Mother 74   • Hypertension Father    • Stroke Father 65        tia   • Diabetes Brother         DMII   • Heart Disease Paternal Grandmother 55        fatal - no details available       ROS:    Respiratory: Negative for cough, sputum production, shortness of breath or wheezing.    Cardiovascular: Negative for chest pain, palpitations, orthopnea, dyspnea with exertion or edema.   Gastrointestinal: Negative for GI upset, nausea, vomiting, abdominal pain, constipation or diarrhea.   Genitourinary: Negative for dysuria, urgency, hesitancy or frequency.       Exam:    /80 (BP Location: Left arm, Patient Position:  "Sitting, BP Cuff Size: Adult)   Pulse 65   Temp 36.5 °C (97.7 °F) (Temporal)   Resp 14   Ht 1.575 m (5' 2\")   Wt 91.7 kg (202 lb 1.6 oz)   SpO2 95%  Body mass index is 36.96 kg/m².  General:  Well nourished, well developed female in NAD.  Neck: Supple without bruit. Thyroid is not enlarged.  Pulmonary: Thorax is symmetric with good equal bilateral expansion. No use of accessory muscles or evidence of retractions. Breath sounds vesicular with no crackles or rhonchi noted. All lung fields resonant without evidence of consolidation. No bronchophony.   Cardiovascular: The heart is regular rate and rhythm. There’s no Murmurs rubs or galops. S1 and S2 are present. There is no S3 heart. There is no S4. The PMI is at the 5th intercostal space at the midclavicular line.   Extremities: No LE edema noted.  Neuro: Grossly nonfocal.  Psych: Alert and oriented to person, place, and time. Appropriate mood and conversation.        This dictation was created using voice recognition software. I have made reasonable attempts to correct errors, however, errors of grammar and content may exist.          Assessment/Plan:    1. Essential hypertension  Continue on medications as prescribed. Home logs of BP.      2. Episode of recurrent major depressive disorder, unspecified depression episode severity (HCC)  Continue with stress management. Discussed S/S of major depression symptoms. Continue taking Prozac as prescribed.     3. Hypercholesterolemia   Continue on atorvastatin, follow up with labs in 6 months.      4. Vitamin D deficiency  Start taking Vit D 3 time a week.     5. Class 1 obesity without serious comorbidity with body mass index (BMI) of 34.0 to 34.9 in adult, unspecified obesity     Discussed diet, exercise, weight loss, behavioral modification, portion size management.       Patient was seen for 25 minutes face to face of which more than 50% of the time was spent in counseling and coordination of care regarding the " above problems.

## 2020-10-07 ENCOUNTER — APPOINTMENT (RX ONLY)
Dept: URBAN - NONMETROPOLITAN AREA CLINIC 15 | Facility: CLINIC | Age: 60
Setting detail: DERMATOLOGY
End: 2020-10-07

## 2020-10-07 DIAGNOSIS — L81.4 OTHER MELANIN HYPERPIGMENTATION: ICD-10-CM

## 2020-10-07 DIAGNOSIS — L82.1 OTHER SEBORRHEIC KERATOSIS: ICD-10-CM

## 2020-10-07 PROCEDURE — ? COUNSELING

## 2020-10-07 PROCEDURE — 99212 OFFICE O/P EST SF 10 MIN: CPT

## 2020-10-07 PROCEDURE — ? LIQUID NITROGEN

## 2020-10-07 ASSESSMENT — LOCATION DETAILED DESCRIPTION DERM
LOCATION DETAILED: LEFT INFERIOR MEDIAL FOREHEAD
LOCATION DETAILED: LEFT CENTRAL BUCCAL CHEEK
LOCATION DETAILED: LEFT CENTRAL MALAR CHEEK

## 2020-10-07 ASSESSMENT — LOCATION SIMPLE DESCRIPTION DERM
LOCATION SIMPLE: LEFT FOREHEAD
LOCATION SIMPLE: LEFT CHEEK

## 2020-10-07 ASSESSMENT — LOCATION ZONE DERM: LOCATION ZONE: FACE

## 2020-10-07 NOTE — PROCEDURE: LIQUID NITROGEN
Bill Insurance (You Assume Risk Of Denial Or Audit By Selecting Yes): No
Duration Of Freeze Thaw-Cycle (Seconds): 0
Consent: The patient's consent was obtained including but not limited to risks of crusting, scabbing, blistering, scarring, darker or lighter pigmentary change, recurrence, incomplete removal and infection.
Medical Necessity Clause: This procedure was medically necessary because the lesions that were treated were:  If lesion does not resolve, bx is needed.
Medical Necessity Information: It is in your best interest to select a reason for this procedure from the list below. All of these items fulfill various CMS LCD requirements except the new and changing color options.
Post-Care Instructions: I reviewed with the patient in detail post-care instructions. Patient is to wear sunprotection, and avoid picking at any of the treated lesions. Pt may apply Vaseline to crusted or scabbing areas.
Detail Level: Detailed

## 2020-11-03 ENCOUNTER — HOSPITAL ENCOUNTER (OUTPATIENT)
Facility: MEDICAL CENTER | Age: 60
End: 2020-11-03
Attending: PHYSICIAN ASSISTANT
Payer: COMMERCIAL

## 2020-11-03 ENCOUNTER — OFFICE VISIT (OUTPATIENT)
Dept: URGENT CARE | Facility: PHYSICIAN GROUP | Age: 60
End: 2020-11-03
Payer: COMMERCIAL

## 2020-11-03 VITALS
SYSTOLIC BLOOD PRESSURE: 124 MMHG | RESPIRATION RATE: 16 BRPM | OXYGEN SATURATION: 95 % | DIASTOLIC BLOOD PRESSURE: 88 MMHG | WEIGHT: 204 LBS | BODY MASS INDEX: 37.54 KG/M2 | TEMPERATURE: 97.7 F | HEART RATE: 68 BPM | HEIGHT: 62 IN

## 2020-11-03 DIAGNOSIS — Z20.822 SUSPECTED COVID-19 VIRUS INFECTION: ICD-10-CM

## 2020-11-03 DIAGNOSIS — R51.9 HEADACHE DUE TO VIRAL INFECTION: ICD-10-CM

## 2020-11-03 DIAGNOSIS — B34.9 HEADACHE DUE TO VIRAL INFECTION: ICD-10-CM

## 2020-11-03 DIAGNOSIS — R19.7 DIARRHEA, UNSPECIFIED TYPE: ICD-10-CM

## 2020-11-03 DIAGNOSIS — Z20.822 EXPOSURE TO COVID-19 VIRUS: ICD-10-CM

## 2020-11-03 PROCEDURE — U0003 INFECTIOUS AGENT DETECTION BY NUCLEIC ACID (DNA OR RNA); SEVERE ACUTE RESPIRATORY SYNDROME CORONAVIRUS 2 (SARS-COV-2) (CORONAVIRUS DISEASE [COVID-19]), AMPLIFIED PROBE TECHNIQUE, MAKING USE OF HIGH THROUGHPUT TECHNOLOGIES AS DESCRIBED BY CMS-2020-01-R: HCPCS

## 2020-11-03 PROCEDURE — 99214 OFFICE O/P EST MOD 30 MIN: CPT | Mod: CS | Performed by: PHYSICIAN ASSISTANT

## 2020-11-03 ASSESSMENT — FIBROSIS 4 INDEX: FIB4 SCORE: 0.67

## 2020-11-03 ASSESSMENT — ENCOUNTER SYMPTOMS
HEADACHES: 1
CHILLS: 1
DIARRHEA: 1

## 2020-11-03 NOTE — PROGRESS NOTES
"Subjective:   Carey Gutierrez  is a 59 y.o. female who presents for Headache and Diarrhea    This is a new problem.  Patient presents to urgent care with sudden onset today of headache, chills and diarrhea.  Patient has had 2 episodes of loose watery stools.  She has had no nausea or vomiting.  Patient denies documented fever, cough, shortness of breath, chest pain, loss of taste, loss of smell or sore throat.  Patient son tested positive for COVID-19 last week.  Patient did have Covid testing performed last Tuesday however she was asymptomatic at the time.      Headache     Diarrhea   Associated symptoms include chills and headaches.     Review of Systems   Constitutional: Positive for chills.   Gastrointestinal: Positive for diarrhea.   Neurological: Positive for headaches.   All other systems reviewed and are negative.    Allergies   Allergen Reactions   • Erythromycin Diarrhea and Nausea     .   • Lactose Diarrhea and Nausea     .   • Lisinopril Cough     Reviewed past medical, surgical , social and family history.  Reviewed prescription and over-the-counter medications with patient and electronic health record today.     Objective:   /88 (BP Location: Right arm, Patient Position: Sitting, BP Cuff Size: Large adult)   Pulse 68   Temp 36.5 °C (97.7 °F) (Temporal)   Resp 16   Ht 1.575 m (5' 2\")   Wt 92.5 kg (204 lb)   LMP 01/16/2010   SpO2 95%   BMI 37.31 kg/m²   Physical Exam  Vitals signs reviewed.   Constitutional:       General: She is not in acute distress.     Appearance: She is well-developed. She is not ill-appearing or toxic-appearing.   HENT:      Head: Normocephalic and atraumatic.      Right Ear: Tympanic membrane, ear canal and external ear normal.      Left Ear: Tympanic membrane, ear canal and external ear normal.      Nose: Nose normal.      Mouth/Throat:      Lips: Pink. No lesions.      Mouth: Mucous membranes are moist.      Pharynx: Oropharynx is clear. Uvula midline. No " oropharyngeal exudate.   Eyes:      General: Lids are normal.      Extraocular Movements: Extraocular movements intact.      Conjunctiva/sclera: Conjunctivae normal.      Pupils: Pupils are equal, round, and reactive to light.   Neck:      Musculoskeletal: Normal range of motion and neck supple.   Cardiovascular:      Rate and Rhythm: Normal rate and regular rhythm.      Heart sounds: Normal heart sounds. No murmur. No friction rub. No gallop.    Pulmonary:      Effort: Pulmonary effort is normal. No respiratory distress.      Breath sounds: Normal breath sounds.   Abdominal:      General: Bowel sounds are normal. There is no distension.      Palpations: Abdomen is soft. There is no mass.      Tenderness: There is generalized abdominal tenderness. There is no right CVA tenderness, left CVA tenderness, guarding or rebound.   Musculoskeletal: Normal range of motion.         General: No tenderness or deformity.   Lymphadenopathy:      Head:      Right side of head: No submental, submandibular or tonsillar adenopathy.      Left side of head: No submental, submandibular or tonsillar adenopathy.      Cervical: No cervical adenopathy.      Upper Body:      Right upper body: No supraclavicular adenopathy.      Left upper body: No supraclavicular adenopathy.   Skin:     General: Skin is warm and dry.      Findings: No rash.   Neurological:      Mental Status: She is alert and oriented to person, place, and time.      Cranial Nerves: Cranial nerves are intact. No cranial nerve deficit.      Sensory: Sensation is intact. No sensory deficit.      Motor: Motor function is intact.      Coordination: Coordination is intact. Coordination normal.      Gait: Gait is intact.   Psychiatric:         Attention and Perception: Attention normal.         Mood and Affect: Mood and affect normal.         Speech: Speech normal.         Behavior: Behavior normal. Behavior is cooperative.         Thought Content: Thought content normal.          Judgment: Judgment normal.           Assessment/Plan:   1. Headache due to viral infection  - COVID/SARS COV-2 PCR; Future    2. Diarrhea, unspecified type  - COVID/SARS COV-2 PCR; Future    3. Suspected COVID-19 virus infection  - COVID/SARS COV-2 PCR; Future    4. Exposure to COVID-19 virus  - COVID/SARS COV-2 PCR; Future    Testing performed for COVID-19.  Patient is given printed instructions regarding self-isolation.  Work note is provided with specific return to work protocols.  Reviewed with patient that if he does test positive he will be contacted by his local health department regarding return to work protocols.  Results will also be released to patient in [facility] and call to the patient directly.  Encouraged mask use, frequent handwashing, wiping down hard surfaces, etc.    Pedialyte, BRAT diet with advance as tolerated. Signs and symptoms of dehydration reviewed.   May use Tylenol or ibuprofen over-the-counter as needed for pain or fever not to exceed recommended daily dose.    Upon entering exam room I ensured patient was wearing a mask.  This provider wore appropriate PPE throughout entire visit.  Patient wore mask entire visit except for a brief period while examining oropharynx.    Differential diagnosis, natural history, supportive care, and indications for immediate follow-up discussed.     Red flag warning symptoms and strict ER/follow-up precautions given.  The patient demonstrated a good understanding and agreed with the treatment plan.  Please note that this note was created using voice recognition speech to text software. Every effort has been made to correct obvious errors.  However, I expect there are errors of grammar and possibly context that were not discovered prior to finalizing the note  MICHELLE Mendoza PA-C

## 2020-11-03 NOTE — PATIENT INSTRUCTIONS
INSTRUCTIONS FOR COVID-19 OR ANY OTHER INFECTIOUS RESPIRATORY ILLNESSES    The Centers for Disease Control and Prevention (CDC) states that early indications for COVID-19 include cough, shortness of breath, difficulty breathing, or at least two of the following symptoms: chills, shaking with chills, muscle pain, headache, sore throat, and loss of taste or smell. Symptoms can range from mild to severe and may appear up to two weeks after exposure to the virus.    The practice of self-isolation and quarantine helps protect the public and your family by  preventing exposure to people who have or may have a contagious disease. Please follow the prevention steps below as based on CDC guidelines:    WHEN TO STOP ISOLATION: Persons with COVID-19 or any other infectious respiratory illness who have symptoms and were advised to care for themselves at home may discontinue home isolation under the following conditions:  · At least 24 hours have passed since recovery defined as resolution of fever without the use of fever-reducing medications; AND,  · Improvement in respiratory symptoms (e.g., cough, shortness of breath); AND,  · At least 10 days have passed since symptoms first appeared and have had no subsequent illness.    MONITOR YOUR SYMPTOMS: If your illness is worsening, seek prompt medical attention. If you have a medical emergency and need to call 911, notify the dispatch personnel that you have, or are being evaluated for confirmed or suspected COVID-19 or another infectious respiratory illness. Wear a facemask if possible.    ACTIVITY RESTRICTION: restrict activities outside your home, except for getting medical care. Do not go to work, school, or public areas. Avoid using public transportation, ride-sharing, or taxis.    SCHEDULED MEDICAL APPOINTMENTS: Notify your provider that you have, or are being evaluated for, confirmed or suspected COVID-19 or another infectious respiratory. This will help the healthcare  provider’s office safely take care of you and keep other people from getting exposed or infected.    FACEMASKS, when to wear: Anytime you are away from your home or around other people or pets. If you are unable to wear one, maintain a minimum of 6 feet distancing from others.    LIVING ENVIRONMENT: Stay in a separate room from other people and pets. If possible, use a separate bathroom, have someone else care for your pets and avoid sharing household items. Any items used should be washed thoroughly with soap and water. Clean all “high-touch” surfaces every day. Use a household cleaning spray or wipe, according to the label instructions. High touch surfaces include (but are not limited to) counters, tabletops, doorknobs, bathroom fixtures, toilets, phones, keyboards, tablets, and bedside tables.     HAND WASHING: Frequently wash hands with soap and water for at least 20 seconds,  especially after blowing your nose, coughing, or sneezing; going to the bathroom; before and after interacting with pets; and before and after eating or preparing food. If hands are visibly dirty use soap and water. If soap and water are not available, use an alcohol-based hand  with at least 60% alcohol. Avoid touching your eyes, nose, and mouth with unwashed hands. Cover your coughs and sneezes with a tissue. Throw used tissues in a lined trash can. Immediately wash your hands.    ACTIVE/FACILITATED SELF-MONITORING: Follow instructions provided by your local health department or health professionals, as appropriate. When working with your local health department check their available hours.    [facility]   Phone Number   [facility] [phone]   [facility] [place] [phone]   [place] [address]   [place] [phone]     IF YOU HAVE CONFIRMED POSITIVE COVID-19:    Those who have completely recovered from COVID-19 may have immune-boosting antibodies in their plasma--called “convalescent plasma”--that could be  used to treat critically ill COVID19 patients.    [facility] is excited to begin working with [facility] on collecting convalescent plasma from  people who have recovered from COVID-19 as part of a program to treat patients infected with the virus. This FDA-approved “emergency investigational new drug” is a special blood product containing antibodies that may give patients an extra boost to fight the virus.    To be eligible to donate convalescent plasma, you must have a prior COVID-19 diagnosis documented by a laboratory test (or a positive test result for SARS-CoV-2 antibodies) and meet additional eligibility requirements.    If you are interested in donating convalescent plasma or have any additional questions, please contact the [facility] Convalescent Plasma  at [phone] or via e-mail at [email or web address].

## 2020-11-04 DIAGNOSIS — R19.7 DIARRHEA, UNSPECIFIED TYPE: ICD-10-CM

## 2020-11-04 DIAGNOSIS — Z20.822 SUSPECTED COVID-19 VIRUS INFECTION: ICD-10-CM

## 2020-11-04 DIAGNOSIS — R51.9 HEADACHE DUE TO VIRAL INFECTION: ICD-10-CM

## 2020-11-04 DIAGNOSIS — B34.9 HEADACHE DUE TO VIRAL INFECTION: ICD-10-CM

## 2020-11-04 DIAGNOSIS — Z20.822 EXPOSURE TO COVID-19 VIRUS: ICD-10-CM

## 2020-11-04 LAB — COVID ORDER STATUS COVID19: NORMAL

## 2020-11-05 ENCOUNTER — TELEPHONE (OUTPATIENT)
Dept: URGENT CARE | Facility: PHYSICIAN GROUP | Age: 60
End: 2020-11-05

## 2020-11-05 LAB
SARS-COV-2 RNA RESP QL NAA+PROBE: NOTDETECTED
SPECIMEN SOURCE: NORMAL

## 2020-11-05 NOTE — TELEPHONE ENCOUNTER
Patient contacted by telephone with test results negative for COVID-19.  Proceed with plan discussed.  MICHELLE Mendoza PA-C

## 2021-02-23 ENCOUNTER — HOSPITAL ENCOUNTER (OUTPATIENT)
Dept: LAB | Facility: MEDICAL CENTER | Age: 61
End: 2021-02-23
Attending: INTERNAL MEDICINE
Payer: COMMERCIAL

## 2021-02-23 DIAGNOSIS — I10 ESSENTIAL HYPERTENSION: Chronic | ICD-10-CM

## 2021-02-23 DIAGNOSIS — R53.82 CHRONIC FATIGUE: ICD-10-CM

## 2021-02-23 DIAGNOSIS — E78.00 HYPERCHOLESTEROLEMIA: Chronic | ICD-10-CM

## 2021-02-23 DIAGNOSIS — E78.5 DYSLIPIDEMIA: ICD-10-CM

## 2021-02-23 DIAGNOSIS — E55.9 VITAMIN D DEFICIENCY: ICD-10-CM

## 2021-02-23 DIAGNOSIS — E66.9 CLASS 1 OBESITY WITHOUT SERIOUS COMORBIDITY WITH BODY MASS INDEX (BMI) OF 34.0 TO 34.9 IN ADULT, UNSPECIFIED OBESITY TYPE: ICD-10-CM

## 2021-02-23 LAB
25(OH)D3 SERPL-MCNC: 49 NG/ML (ref 30–100)
ALBUMIN SERPL BCP-MCNC: 4.3 G/DL (ref 3.2–4.9)
ALBUMIN/GLOB SERPL: 1.6 G/DL
ALP SERPL-CCNC: 80 U/L (ref 30–99)
ALT SERPL-CCNC: 18 U/L (ref 2–50)
ANION GAP SERPL CALC-SCNC: 12 MMOL/L (ref 7–16)
AST SERPL-CCNC: 14 U/L (ref 12–45)
BASOPHILS # BLD AUTO: 0.5 % (ref 0–1.8)
BASOPHILS # BLD: 0.04 K/UL (ref 0–0.12)
BILIRUB SERPL-MCNC: 0.4 MG/DL (ref 0.1–1.5)
BUN SERPL-MCNC: 18 MG/DL (ref 8–22)
CALCIUM SERPL-MCNC: 9.7 MG/DL (ref 8.5–10.5)
CHLORIDE SERPL-SCNC: 102 MMOL/L (ref 96–112)
CHOLEST SERPL-MCNC: 136 MG/DL (ref 100–199)
CO2 SERPL-SCNC: 25 MMOL/L (ref 20–33)
CREAT SERPL-MCNC: 0.7 MG/DL (ref 0.5–1.4)
EOSINOPHIL # BLD AUTO: 0.33 K/UL (ref 0–0.51)
EOSINOPHIL NFR BLD: 4.4 % (ref 0–6.9)
ERYTHROCYTE [DISTWIDTH] IN BLOOD BY AUTOMATED COUNT: 40.5 FL (ref 35.9–50)
EST. AVERAGE GLUCOSE BLD GHB EST-MCNC: 111 MG/DL
FASTING STATUS PATIENT QL REPORTED: NORMAL
GLOBULIN SER CALC-MCNC: 2.7 G/DL (ref 1.9–3.5)
GLUCOSE SERPL-MCNC: 99 MG/DL (ref 65–99)
HBA1C MFR BLD: 5.5 % (ref 4–5.6)
HCT VFR BLD AUTO: 42 % (ref 37–47)
HDLC SERPL-MCNC: 33 MG/DL
HGB BLD-MCNC: 14.2 G/DL (ref 12–16)
IMM GRANULOCYTES # BLD AUTO: 0.03 K/UL (ref 0–0.11)
IMM GRANULOCYTES NFR BLD AUTO: 0.4 % (ref 0–0.9)
LDLC SERPL CALC-MCNC: 81 MG/DL
LYMPHOCYTES # BLD AUTO: 2.17 K/UL (ref 1–4.8)
LYMPHOCYTES NFR BLD: 29 % (ref 22–41)
MCH RBC QN AUTO: 30 PG (ref 27–33)
MCHC RBC AUTO-ENTMCNC: 33.8 G/DL (ref 33.6–35)
MCV RBC AUTO: 88.8 FL (ref 81.4–97.8)
MONOCYTES # BLD AUTO: 0.32 K/UL (ref 0–0.85)
MONOCYTES NFR BLD AUTO: 4.3 % (ref 0–13.4)
NEUTROPHILS # BLD AUTO: 4.59 K/UL (ref 2–7.15)
NEUTROPHILS NFR BLD: 61.4 % (ref 44–72)
NRBC # BLD AUTO: 0 K/UL
NRBC BLD-RTO: 0 /100 WBC
PLATELET # BLD AUTO: 282 K/UL (ref 164–446)
PMV BLD AUTO: 9.9 FL (ref 9–12.9)
POTASSIUM SERPL-SCNC: 3.8 MMOL/L (ref 3.6–5.5)
PROT SERPL-MCNC: 7 G/DL (ref 6–8.2)
RBC # BLD AUTO: 4.73 M/UL (ref 4.2–5.4)
SODIUM SERPL-SCNC: 139 MMOL/L (ref 135–145)
TRIGL SERPL-MCNC: 112 MG/DL (ref 0–149)
TSH SERPL DL<=0.005 MIU/L-ACNC: 1.93 UIU/ML (ref 0.38–5.33)
WBC # BLD AUTO: 7.5 K/UL (ref 4.8–10.8)

## 2021-02-23 PROCEDURE — 36415 COLL VENOUS BLD VENIPUNCTURE: CPT

## 2021-02-23 PROCEDURE — 84443 ASSAY THYROID STIM HORMONE: CPT

## 2021-02-23 PROCEDURE — 83036 HEMOGLOBIN GLYCOSYLATED A1C: CPT

## 2021-02-23 PROCEDURE — 80061 LIPID PANEL: CPT

## 2021-02-23 PROCEDURE — 82306 VITAMIN D 25 HYDROXY: CPT

## 2021-02-23 PROCEDURE — 85025 COMPLETE CBC W/AUTO DIFF WBC: CPT

## 2021-02-23 PROCEDURE — 80053 COMPREHEN METABOLIC PANEL: CPT

## 2021-03-03 ENCOUNTER — TELEMEDICINE (OUTPATIENT)
Dept: MEDICAL GROUP | Facility: PHYSICIAN GROUP | Age: 61
End: 2021-03-03
Payer: COMMERCIAL

## 2021-03-03 VITALS
HEART RATE: 59 BPM | SYSTOLIC BLOOD PRESSURE: 130 MMHG | BODY MASS INDEX: 35.51 KG/M2 | HEIGHT: 62 IN | WEIGHT: 193 LBS | TEMPERATURE: 96 F | DIASTOLIC BLOOD PRESSURE: 82 MMHG

## 2021-03-03 DIAGNOSIS — F41.9 ANXIETY: ICD-10-CM

## 2021-03-03 DIAGNOSIS — Z71.89 CPAP USE COUNSELING: ICD-10-CM

## 2021-03-03 DIAGNOSIS — I10 ESSENTIAL HYPERTENSION: Chronic | ICD-10-CM

## 2021-03-03 DIAGNOSIS — E66.9 CLASS 1 OBESITY WITHOUT SERIOUS COMORBIDITY WITH BODY MASS INDEX (BMI) OF 34.0 TO 34.9 IN ADULT, UNSPECIFIED OBESITY TYPE: ICD-10-CM

## 2021-03-03 DIAGNOSIS — E55.9 VITAMIN D DEFICIENCY: ICD-10-CM

## 2021-03-03 DIAGNOSIS — E78.00 HYPERCHOLESTEROLEMIA: Chronic | ICD-10-CM

## 2021-03-03 PROCEDURE — 99214 OFFICE O/P EST MOD 30 MIN: CPT | Mod: 95,CR | Performed by: INTERNAL MEDICINE

## 2021-03-03 ASSESSMENT — FIBROSIS 4 INDEX: FIB4 SCORE: 0.69

## 2021-03-03 NOTE — PROGRESS NOTES
Chief Complaint   Patient presents with   • Lab Results       HISTORY OF PRESENT ILLNESS: Patient is a 60 y.o. female established patient who presents today to discuss the medical issues below.  Telemed due to covid, patient gave consent and states understanding of limitation to telemed.      Essential hypertension  Continues on hydrochlorothiazide 12.5 losartan 50 mg a day. Not following bp at home.      Hypercholesterolemia  Continues on atorvastatin 10 mg a day.  Had lab work done.    Obesity  Home reported weight down 10 pounds. She did note about 30# increase with the Covid.    CPAP use counseling  Patient has had CPAP in the past but didn't tolerate the mask well.  She declines referral to sleep medicine for now.      Vitamin D deficiency  On supplement.       Patient Active Problem List    Diagnosis Date Noted   • Obesity 09/30/2013   • Essential hypertension 02/05/2013   • Hypercholesterolemia 02/05/2013   • MDD (major depressive disorder) 01/29/2013   • CPAP use counseling 03/03/2021   • Primary osteoarthritis of left knee 03/21/2019   • Blood in stool 09/19/2018   • Incomplete right bundle branch block (RBBB) determined by electrocardiography 04/27/2018   • History of sciatica 03/30/2017   • Vitamin D deficiency 12/31/2015   • Fatigue 12/31/2015   • Eczema, dyshidrotic 11/12/2013   • SNORING DISORDER 02/20/2013   • Anxiety 01/29/2013       Allergies:Erythromycin, Lactose, and Lisinopril    Current Outpatient Medications   Medication Sig Dispense Refill   • atorvastatin (LIPITOR) 10 MG Tab Take 1 Tab by mouth every day. 90 Tab 2   • FLUoxetine (PROZAC) 20 MG Cap Take 1 Cap by mouth every day. 90 Cap 3   • hydroCHLOROthiazide (HYDRODIURIL) 12.5 MG tablet Take 1 Tab by mouth every day. 90 Tab 3   • losartan (COZAAR) 50 MG Tab Take 1 Tab by mouth every day. 90 Tab 3   • vitamin D, Ergocalciferol, (DRISDOL) 1.25 MG (10764 UT) Cap capsule Take 1 Cap by mouth 3 times a week. 36 Cap 3   • aspirin (ASA) 81 MG  "Chew Tab chewable tablet Take 1 Tab by mouth every day. 100 Tab 10     No current facility-administered medications for this visit.         Past Medical History:   Diagnosis Date   • Arthritis     Osteo to knee   • depression     taking prozac,wellbutrin   • High cholesterol    • History of sciatica 3/30/2017   • Hyperlipidemia    • Hypertension    • SURI (obstructive sleep apnea)     C-PAP sometimes sleep study    • Other specified symptom associated with female genital organs    • Right bundle branch block (RBBB)    • Snoring        Social History     Tobacco Use   • Smoking status: Former Smoker     Packs/day: 0.00     Quit date: 1989     Years since quittin.1   • Smokeless tobacco: Never Used   • Tobacco comment: 1 ppd 7 yrs,quit    Substance Use Topics   • Alcohol use: Yes     Alcohol/week: 7.0 oz     Types: 14 Standard drinks or equivalent per week     Comment: rare   • Drug use: No     Comment: remote heavy cocaine usage        Family Status   Relation Name Status   • Mo  (Not Specified)   • Fa  (Not Specified)   • Bro  (Not Specified)   • PGMo  (Not Specified)     Family History   Problem Relation Age of Onset   • Hypertension Mother    • Stroke Mother 74   • Hypertension Father    • Stroke Father 65        tia   • Diabetes Brother         DMII   • Heart Disease Paternal Grandmother 55        fatal - no details available       ROS:    Respiratory: Negative for cough, sputum production, shortness of breath or wheezing.    Cardiovascular: Negative for chest pain, palpitations, orthopnea, dyspnea with exertion or edema.   Gastrointestinal: Negative for GI upset, nausea, vomiting, abdominal pain, constipation or diarrhea.   Genitourinary: Negative for dysuria, urgency, hesitancy or frequency.       Exam:    /82 (BP Location: Left arm, Patient Position: Sitting)   Pulse (!) 59   Temp (!) 35.6 °C (96 °F) (Temporal)   Ht 1.575 m (5' 2\")   Wt 87.5 kg (193 lb)  Body mass index is 35.3 " kg/m².  General:  Well nourished, well developed female in NAD.  Pulmonary: Clear to ausculation and percussion.  Normal effort. No rales, rhonchi, or wheezing.  Cardiovascular: Regular rate and rhythm without murmur.   Abdomen: Normal bowel sounds soft and nontender no palpable liver spleen bladder mass.  Extremities: No LE edema noted.  Neuro: Grossly nonfocal.  Psych: Alert and oriented to person, place, and time. Appropriate mood and conversation.    LABS: Results reviewed and discussed with the patient, questions answered.    This dictation was created using voice recognition software. I have made reasonable attempts to correct errors, however, errors of grammar and content may exist.          Assessment/Plan:    1. Essential hypertension  Discussed bp monitoring.  Goal, she will come by and check her  Home bp machine at home with our readings and report.  Monitor with diet exercise and weight.      2. Hypercholesterolemia  Controlled with minimal atorvastatin.      3. Class 1 obesity without serious comorbidity with body mass index (BMI) of 34.0 to 34.9 in adult, unspecified obesity type  Support.     4. CPAP use counseling  Discussed changes in the mask, she continues to decline referral.     5. Vitamin D deficiency  adequatly replaced.      6. Anxiety,  Stable on the Prozac.    Patient was seen for 25 minutes face to face of which more than 50% of the time was spent in counseling and coordination of care regarding the above problems.

## 2021-03-03 NOTE — ASSESSMENT & PLAN NOTE
Patient has had CPAP in the past but didn't tolerate the mask well.  She declines referral to sleep medicine for now.

## 2021-07-29 ENCOUNTER — OFFICE VISIT (OUTPATIENT)
Dept: MEDICAL GROUP | Facility: PHYSICIAN GROUP | Age: 61
End: 2021-07-29
Payer: COMMERCIAL

## 2021-07-29 VITALS
TEMPERATURE: 98.1 F | OXYGEN SATURATION: 95 % | WEIGHT: 200.8 LBS | HEART RATE: 60 BPM | SYSTOLIC BLOOD PRESSURE: 126 MMHG | DIASTOLIC BLOOD PRESSURE: 78 MMHG | RESPIRATION RATE: 16 BRPM | HEIGHT: 62 IN | BODY MASS INDEX: 36.95 KG/M2

## 2021-07-29 DIAGNOSIS — H60.501 ACUTE OTITIS EXTERNA OF RIGHT EAR, UNSPECIFIED TYPE: ICD-10-CM

## 2021-07-29 PROCEDURE — 99213 OFFICE O/P EST LOW 20 MIN: CPT | Performed by: NURSE PRACTITIONER

## 2021-07-29 RX ORDER — CHLORAL HYDRATE 500 MG
1000 CAPSULE ORAL ONCE
COMMUNITY

## 2021-07-29 RX ORDER — OFLOXACIN 3 MG/ML
10 SOLUTION AURICULAR (OTIC) DAILY
Qty: 10 ML | Refills: 0 | Status: SHIPPED | OUTPATIENT
Start: 2021-07-29

## 2021-07-29 ASSESSMENT — FIBROSIS 4 INDEX: FIB4 SCORE: 0.69

## 2021-07-29 NOTE — ASSESSMENT & PLAN NOTE
"Patient reports 3-day onset of sensation in right ear.  \"Feels like there is air or water in it.\"  Found small amount of blood on her pillow.  Swim in the ocean 3 days ago.  She wonders if she perforated her eardrum when she was cleaning the wax out a month ago, denies any pain at the time.  "

## 2021-07-29 NOTE — PROGRESS NOTES
"CC: Right ear concern    HISTORY OF THE PRESENT ILLNESS: Patient is a 60 y.o. female. This pleasant patient is here today for evaluation and management of the following health problems.     Patient is new to me.  Primary care provider is Dr. Barger.        Acute otitis externa of right ear  Patient reports 3-day onset of sensation in right ear.  \"Feels like there is air or water in it.\"  Found small amount of blood on her pillow.  Swim in the ocean 3 days ago.  She wonders if she perforated her eardrum when she was cleaning the wax out a month ago, denies any pain at the time.      Allergies: Erythromycin, Lactose, and Lisinopril    Current Outpatient Medications Ordered in Epic   Medication Sig Dispense Refill   • Omega-3 Fatty Acids (FISH OIL) 1000 MG Cap capsule Take 1,000 mg by mouth one time.     • ofloxacin otic sol (FLOXIN OTIC) 0.3 % Solution Administer 10 Drops into the right ear every day. Administer drops to both ears. 10 mL 0   • atorvastatin (LIPITOR) 10 MG Tab Take 1 Tab by mouth every day. 90 Tab 2   • FLUoxetine (PROZAC) 20 MG Cap Take 1 Cap by mouth every day. 90 Cap 3   • hydroCHLOROthiazide (HYDRODIURIL) 12.5 MG tablet Take 1 Tab by mouth every day. 90 Tab 3   • losartan (COZAAR) 50 MG Tab Take 1 Tab by mouth every day. 90 Tab 3   • vitamin D, Ergocalciferol, (DRISDOL) 1.25 MG (18335 UT) Cap capsule Take 1 Cap by mouth 3 times a week. (Patient taking differently: Take 50,000 Units by mouth two times a week.) 36 Cap 3   • aspirin (ASA) 81 MG Chew Tab chewable tablet Take 1 Tab by mouth every day. 100 Tab 10     No current [facility]-ordered facility-administered medications on file.       Past Medical History:   Diagnosis Date   • Arthritis     Osteo to knee   • depression     taking prozac,wellbutrin   • High cholesterol    • History of sciatica 3/30/2017   • Hyperlipidemia    • Hypertension    • SURI (obstructive sleep apnea)     C-PAP sometimes sleep study 2017   • Other specified symptom associated " "with female genital organs    • Right bundle branch block (RBBB)    • Snoring        Past Surgical History:   Procedure Laterality Date   • KNEE ARTHROSCOPY Left 12/3/2019    Procedure: ARTHROSCOPY, KNEE;  Surgeon: Harmeet Frias M.D.;  Location: SURGERY [facility];  Service: Orthopedics   • MEDIAL MENISCECTOMY  12/3/2019    Procedure: MENISCECTOMY, KNEE, MEDIAL - PARTIAL AND GARCIA;  Surgeon: Harmeet Frias M.D.;  Location: SURGERY [facility];  Service: Orthopedics   • HYSTERECTOMY ROBOTIC  3/9/2010    Performed by DONTRELL DURAN at SURGERY [facility]   • CYSTOSCOPY  3/9/2010    Performed by DONTRELL DURAN at SURGERY [facility]   • PRIMARY C SECTION  05   • APPENDECTOMY     • TONSILLECTOMY         Social History     Tobacco Use   • Smoking status: Former Smoker     Packs/day: 0.00     Quit date: 1989     Years since quittin.5   • Smokeless tobacco: Never Used   • Tobacco comment: 1 ppd 7 yrs,quit    Vaping Use   • Vaping Use: Never used   Substance Use Topics   • Alcohol use: Yes     Alcohol/week: 7.0 oz     Types: 14 Standard drinks or equivalent per week     Comment: rare   • Drug use: No     Comment: remote heavy cocaine usage        Family History   Problem Relation Age of Onset   • Hypertension Mother    • Stroke Mother 74   • Hypertension Father    • Stroke Father 65        tia   • Diabetes Brother         DMII   • Heart Disease Paternal Grandmother 55        fatal - no details available       ROS:   As in HPI, otherwise negative for chest pain, dyspnea, fever      Exam: /78 (BP Location: Right arm, Patient Position: Sitting, BP Cuff Size: Adult long)   Pulse 60   Temp 36.7 °C (98.1 °F) (Temporal)   Resp 16   Ht 1.575 m (5' 2\")   Wt 91.1 kg (200 lb 12.8 oz)   SpO2 95%  Body mass index is 36.73 kg/m².    General: Alert, pleasant, well nourished, well developed female in NAD  HEENT: Normocephalic. Eyes conjunctiva clear lids " without ptosis, pupils equal and reactive to light, ears normal shape and contour.  Right ear canal edematous and mild erythema, no drainage, tympanic membrane pearly gray with good light reflex.  Left ear canal is clear, tympanic membranes are pearly gray with good light reflex, nasal mucosa without erythema and drainage, oropharynx is without erythema, edema or exudates.   Neck: Supple without bruit. Thyroid is not enlarged.  Pulmonary: Clear to ausculation.  Normal effort. No rales, ronchi, or wheezing.  Cardiovascular: Normal rate and rhythm without murmur.   Neurologic: Grossly nonfocal  Lymph: No cervical or supraclavicular lymph nodes are palpable  Skin: Warm and dry.   Psych: Normal mood and affect. Alert and oriented. Judgment and insight is normal.    Please note that this dictation was created using voice recognition software. I have made every reasonable attempt to correct obvious errors, but I expect that there are errors of grammar and possibly content that I did not discover before finalizing the note.      Assessment/Plan  1. Acute otitis externa of right ear, unspecified type  We will prescribe ofloxacin eardrops.  Instructions reviewed with patient.  She will return to clinic if symptoms do not improve or if they do worsen.  - ofloxacin otic sol (FLOXIN OTIC) 0.3 % Solution; Administer 10 Drops into the right ear every day. Administer drops to both ears.  Dispense: 10 mL; Refill: 0

## 2021-09-14 RX ORDER — LOSARTAN POTASSIUM 50 MG/1
TABLET ORAL
Qty: 90 TABLET | Refills: 0 | Status: SHIPPED | OUTPATIENT
Start: 2021-09-14 | End: 2021-09-22 | Stop reason: SDUPTHER

## 2021-09-14 RX ORDER — FLUOXETINE HYDROCHLORIDE 20 MG/1
CAPSULE ORAL
Qty: 90 CAPSULE | Refills: 0 | Status: SHIPPED | OUTPATIENT
Start: 2021-09-14 | End: 2021-09-22 | Stop reason: SDUPTHER

## 2021-09-22 ENCOUNTER — OFFICE VISIT (OUTPATIENT)
Dept: MEDICAL GROUP | Facility: PHYSICIAN GROUP | Age: 61
End: 2021-09-22
Payer: COMMERCIAL

## 2021-09-22 VITALS
HEART RATE: 60 BPM | HEIGHT: 62 IN | WEIGHT: 200 LBS | SYSTOLIC BLOOD PRESSURE: 126 MMHG | BODY MASS INDEX: 36.8 KG/M2 | DIASTOLIC BLOOD PRESSURE: 70 MMHG | OXYGEN SATURATION: 95 % | TEMPERATURE: 97.2 F | RESPIRATION RATE: 16 BRPM

## 2021-09-22 DIAGNOSIS — E78.00 HYPERCHOLESTEROLEMIA: Chronic | ICD-10-CM

## 2021-09-22 DIAGNOSIS — E66.9 CLASS 1 OBESITY WITHOUT SERIOUS COMORBIDITY WITH BODY MASS INDEX (BMI) OF 34.0 TO 34.9 IN ADULT, UNSPECIFIED OBESITY TYPE: ICD-10-CM

## 2021-09-22 DIAGNOSIS — F41.9 ANXIETY: ICD-10-CM

## 2021-09-22 DIAGNOSIS — E78.5 DYSLIPIDEMIA: ICD-10-CM

## 2021-09-22 DIAGNOSIS — I10 ESSENTIAL HYPERTENSION: Chronic | ICD-10-CM

## 2021-09-22 DIAGNOSIS — Z23 NEEDS FLU SHOT: ICD-10-CM

## 2021-09-22 DIAGNOSIS — F33.9 EPISODE OF RECURRENT MAJOR DEPRESSIVE DISORDER, UNSPECIFIED DEPRESSION EPISODE SEVERITY (HCC): ICD-10-CM

## 2021-09-22 DIAGNOSIS — E55.9 VITAMIN D DEFICIENCY: ICD-10-CM

## 2021-09-22 PROCEDURE — 90686 IIV4 VACC NO PRSV 0.5 ML IM: CPT | Performed by: INTERNAL MEDICINE

## 2021-09-22 PROCEDURE — 90471 IMMUNIZATION ADMIN: CPT | Performed by: INTERNAL MEDICINE

## 2021-09-22 PROCEDURE — 99214 OFFICE O/P EST MOD 30 MIN: CPT | Mod: 25 | Performed by: INTERNAL MEDICINE

## 2021-09-22 RX ORDER — ATORVASTATIN CALCIUM 10 MG/1
10 TABLET, FILM COATED ORAL DAILY
Qty: 90 TABLET | Refills: 3 | Status: SHIPPED | OUTPATIENT
Start: 2021-09-22

## 2021-09-22 RX ORDER — HYDROCHLOROTHIAZIDE 12.5 MG/1
12.5 TABLET ORAL DAILY
Qty: 90 TABLET | Refills: 3 | Status: SHIPPED | OUTPATIENT
Start: 2021-09-22

## 2021-09-22 RX ORDER — LOSARTAN POTASSIUM 50 MG/1
TABLET ORAL
Qty: 90 TABLET | Refills: 3 | Status: SHIPPED | OUTPATIENT
Start: 2021-09-22

## 2021-09-22 RX ORDER — ATORVASTATIN CALCIUM 10 MG/1
10 TABLET, FILM COATED ORAL DAILY
Qty: 90 TABLET | Refills: 2 | Status: SHIPPED | OUTPATIENT
Start: 2021-09-22 | End: 2021-09-22 | Stop reason: SDUPTHER

## 2021-09-22 RX ORDER — FLUOXETINE HYDROCHLORIDE 20 MG/1
20 CAPSULE ORAL DAILY
Qty: 90 CAPSULE | Refills: 3 | Status: SHIPPED | OUTPATIENT
Start: 2021-09-22

## 2021-09-22 ASSESSMENT — PATIENT HEALTH QUESTIONNAIRE - PHQ9: CLINICAL INTERPRETATION OF PHQ2 SCORE: 0

## 2021-09-22 ASSESSMENT — FIBROSIS 4 INDEX: FIB4 SCORE: 0.69

## 2021-09-22 NOTE — PROGRESS NOTES
Chief Complaint   Patient presents with   • Follow-Up   • Medication Refill       HISTORY OF PRESENT ILLNESS: Patient is a 60 y.o. female established patient who presents today to discuss the medical issues below.    Essential hypertension  Patient remains on losartan 50 mg a day and hydrochlorothiazide 12.5 mg a day.    MDD (major depressive disorder)  Patient continues on Prozac 20 mg a day.  Stable on meds.  Denies anxiety or depression.     Hypercholesterolemia  Patient utilizing omega-3 and atorvastatin 10 mg a day.  Last labs with reasonable control.    Obesity  Weight about the same.      Anxiety  Patient reports she is doing well.      Vitamin D deficiency  On supplement.        Patient Active Problem List    Diagnosis Date Noted   • Acute otitis externa of right ear 07/29/2021   • CPAP use counseling 03/03/2021   • Primary osteoarthritis of left knee 03/21/2019   • Blood in stool 09/19/2018   • Incomplete right bundle branch block (RBBB) determined by electrocardiography 04/27/2018   • History of sciatica 03/30/2017   • Vitamin D deficiency 12/31/2015   • Fatigue 12/31/2015   • Eczema, dyshidrotic 11/12/2013   • Obesity 09/30/2013   • SNORING DISORDER 02/20/2013   • Essential hypertension 02/05/2013   • Hypercholesterolemia 02/05/2013   • MDD (major depressive disorder) 01/29/2013   • Anxiety 01/29/2013       Allergies:Erythromycin, Lactose, and Lisinopril    Current Outpatient Medications   Medication Sig Dispense Refill   • FLUoxetine (PROZAC) 20 MG Cap Take 1 Capsule by mouth every day. 90 Capsule 3   • losartan (COZAAR) 50 MG Tab Take 1 tablet by mouth once daily 90 Tablet 3   • hydroCHLOROthiazide (HYDRODIURIL) 12.5 MG tablet Take 1 Tablet by mouth every day. 90 Tablet 3   • atorvastatin (LIPITOR) 10 MG Tab Take 1 Tablet by mouth every day. 90 Tablet 3   • Omega-3 Fatty Acids (FISH OIL) 1000 MG Cap capsule Take 1,000 mg by mouth one time.     • vitamin D, Ergocalciferol, (DRISDOL) 1.25 MG (08094 UT)  Cap capsule Take 1 Cap by mouth 3 times a week. (Patient taking differently: Take 50,000 Units by mouth two times a week.) 36 Cap 3   • aspirin (ASA) 81 MG Chew Tab chewable tablet Take 1 Tab by mouth every day. 100 Tab 10   • ofloxacin otic sol (FLOXIN OTIC) 0.3 % Solution Administer 10 Drops into the right ear every day. Administer drops to both ears. (Patient not taking: Reported on 2021) 10 mL 0     No current facility-administered medications for this visit.         Past Medical History:   Diagnosis Date   • Arthritis     Osteo to knee   • depression     taking prozac,wellbutrin   • High cholesterol    • History of sciatica 3/30/2017   • Hyperlipidemia    • Hypertension    • SURI (obstructive sleep apnea)     C-PAP sometimes sleep study    • Other specified symptom associated with female genital organs    • Right bundle branch block (RBBB)    • Snoring        Social History     Tobacco Use   • Smoking status: Former Smoker     Packs/day: 0.00     Quit date: 1989     Years since quittin.7   • Smokeless tobacco: Never Used   • Tobacco comment: 1 ppd 7 yrs,quit    Vaping Use   • Vaping Use: Never used   Substance Use Topics   • Alcohol use: Yes     Alcohol/week: 7.0 oz     Types: 14 Standard drinks or equivalent per week     Comment: rare   • Drug use: No     Comment: remote heavy cocaine usage        Family Status   Relation Name Status   • Mo  (Not Specified)   • Fa  (Not Specified)   • Bro  (Not Specified)   • PGMo  (Not Specified)     Family History   Problem Relation Age of Onset   • Hypertension Mother    • Stroke Mother 74   • Hypertension Father    • Stroke Father 65        tia   • Diabetes Brother         DMII   • Heart Disease Paternal Grandmother 55        fatal - no details available       ROS:    Respiratory: Negative for cough, sputum production, shortness of breath or wheezing.    Cardiovascular: Negative for chest pain, palpitations, orthopnea, dyspnea with exertion or edema.  "  Gastrointestinal: Negative for GI upset, nausea, vomiting, abdominal pain, constipation or diarrhea.   Genitourinary: Negative for dysuria, urgency, hesitancy or frequency.       Exam:    /70   Pulse 60   Temp 36.2 °C (97.2 °F) (Temporal)   Resp 16   Ht 1.575 m (5' 2\")   Wt 90.7 kg (200 lb)   SpO2 95%  Body mass index is 36.58 kg/m².  General:  Well nourished, well developed female in NAD.  Pulmonary: Clear to ausculation and percussion.  Normal effort. No rales, rhonchi, or wheezing.  Cardiovascular: Regular rate and rhythm without murmur.   Abdomen: Normal bowel sounds soft and nontender no palpable liver spleen bladder mass.  Extremities: No LE edema noted.  Neuro: Grossly nonfocal.  Psych: Alert and oriented to person, place, and time. Appropriate mood and conversation.    LABS: Results reviewed and discussed with the patient, questions answered.    This dictation was created using voice recognition software. I have made reasonable attempts to correct errors, however, errors of grammar and content may exist.          Assessment/Plan:    1. Essential hypertension  Well-controlled, continue medications refills written lab schedule    2. Episode of recurrent major depressive disorder, unspecified depression episode severity (HCC)  Doing well with ongoing Prozac.  Support refills written    3. Hypercholesterolemia  Patient continuing on omega-3 and atorvastatin.  Fairly well controlled.  Labs ordered for monitoring as above    4. Dyslipidemia  Refills, labs  - atorvastatin (LIPITOR) 10 MG Tab; Take 1 Tablet by mouth every day.  Dispense: 90 Tablet; Refill: 3    5. Class 1 obesity without serious comorbidity with body mass index (BMI) of 34.0 to 34.9 in adult, unspecified obesity type  Discussed diet, exercise, weight loss, behavioral modification, portion size management.    - Patient identified as having weight management issue.  Appropriate orders and counseling given.    6. Anxiety  Stable on meds " refills written on the Prozac     7. Vitamin D deficiency  assess at next lab draw    8. Needs flu shot    - INFLUENZA VACCINE QUAD INJ 6 MO + (PF) (FLUARIX)         Patient was seen for 25 minutes face to face of which more than 50% of the time was spent in counseling and coordination of care regarding the above problems.

## 2022-01-10 RX ORDER — ERGOCALCIFEROL 1.25 MG/1
CAPSULE ORAL
Qty: 180 CAPSULE | Refills: 1 | Status: SHIPPED | OUTPATIENT
Start: 2022-01-10

## 2022-11-29 ENCOUNTER — OFFICE VISIT (OUTPATIENT)
Dept: URGENT CARE | Facility: PHYSICIAN GROUP | Age: 62
End: 2022-11-29
Payer: COMMERCIAL

## 2022-11-29 VITALS
SYSTOLIC BLOOD PRESSURE: 112 MMHG | HEART RATE: 80 BPM | DIASTOLIC BLOOD PRESSURE: 72 MMHG | WEIGHT: 208 LBS | TEMPERATURE: 98.4 F | BODY MASS INDEX: 38.28 KG/M2 | OXYGEN SATURATION: 97 % | RESPIRATION RATE: 16 BRPM | HEIGHT: 62 IN

## 2022-11-29 DIAGNOSIS — S39.012A STRAIN OF LUMBAR REGION, INITIAL ENCOUNTER: ICD-10-CM

## 2022-11-29 PROCEDURE — 99213 OFFICE O/P EST LOW 20 MIN: CPT | Performed by: STUDENT IN AN ORGANIZED HEALTH CARE EDUCATION/TRAINING PROGRAM

## 2022-11-29 RX ORDER — FLUOXETINE 20 MG/1
20 TABLET, FILM COATED ORAL DAILY
COMMUNITY
Start: 2022-09-26

## 2022-11-29 RX ORDER — LIDOCAINE 4 G/G
PATCH TOPICAL
Qty: 15 PATCH | Refills: 0 | Status: SHIPPED | OUTPATIENT
Start: 2022-11-29

## 2022-11-29 RX ORDER — METHOCARBAMOL 500 MG/1
TABLET, FILM COATED ORAL
Qty: 30 TABLET | Refills: 0 | Status: SHIPPED | OUTPATIENT
Start: 2022-11-29

## 2022-11-29 ASSESSMENT — FIBROSIS 4 INDEX: FIB4 SCORE: 0.7

## 2022-11-30 NOTE — PROGRESS NOTES
Subjective:   CHIEF COMPLAINT  Chief Complaint   Patient presents with    Back Strain     Pulled a muscle in her mid back 3 days ago       HPI  Carey Gutierrez is a 61 y.o. female who presents with a chief complaint of right-sided thoracic back pain after picking up heavy [place] 2 days ago.  She initially developed mild discomfort but symptoms then worsened over the next 24 hours.  She has tried heat, Voltaren gel, Motrin 400 mg which provided some relief.  Reports she is experiencing spasms.  Symptoms are aggravated with flexion and twisting.  No radicular symptoms into the legs.  No spontaneous loss of bowels or bladder.  No saddle anesthesia.  No dysuria or hematuria.  No history of spinal surgery.    REVIEW OF SYSTEMS  General: no fever or chills  GI: no nausea or vomiting  See HPI for further details.    PAST MEDICAL HISTORY   has a past medical history of Arthritis, depression, High cholesterol, History of sciatica (3/30/2017), Hyperlipidemia, Hypertension, SURI (obstructive sleep apnea), Other specified symptom associated with female genital organs, Right bundle branch block (RBBB), and Snoring.    SURGICAL HISTORY   has a past surgical history that includes tonsillectomy (1975); primary c section (2/28/05); appendectomy (1976); hysterectomy robotic (3/9/2010); cystoscopy (3/9/2010); knee arthroscopy (Left, 12/3/2019); and meniscectomy, knee, medial (12/3/2019).    ALLERGIES  Allergies   Allergen Reactions    Erythromycin Diarrhea and Nausea     .    Lactose Diarrhea and Nausea     .    Lisinopril Cough       CURRENT MEDICATIONS  Home Medications       Reviewed by Surinder Johnosn Ass't (Medical Assistant) on 11/29/22 at 174  Med List Status: <None>     Medication Last Dose Status   aspirin (ASA) 81 MG Chew Tab chewable tablet  Active   atorvastatin (LIPITOR) 10 MG Tab Taking Active   FLUoxetine (PROZAC) 20 MG Cap Taking Active   fluoxetine (PROZAC) 20 MG tablet Not Taking Active  "  hydroCHLOROthiazide (HYDRODIURIL) 12.5 MG tablet Taking Active   losartan (COZAAR) 50 MG Tab Taking Active   ofloxacin otic sol (FLOXIN OTIC) 0.3 % Solution  Active   Omega-3 Fatty Acids (FISH OIL) 1000 MG Cap capsule Not Taking Active   vitamin D2, Ergocalciferol, (DRISDOL) 1.25 MG (96236 UT) Cap capsule Taking Active                    SOCIAL HISTORY  Social History     Tobacco Use    Smoking status: Former     Packs/day: 0.00     Types: Cigarettes     Quit date: 1989     Years since quittin.9    Smokeless tobacco: Never    Tobacco comments:     1 ppd 7 yrs,quit    Vaping Use    Vaping Use: Never used   Substance and Sexual Activity    Alcohol use: Yes     Alcohol/week: 7.0 oz     Types: 14 Standard drinks or equivalent per week     Comment: rare    Drug use: No     Comment: remote heavy cocaine usage     Sexual activity: Not Currently       FAMILY HISTORY  Family History   Problem Relation Age of Onset    Hypertension Mother     Stroke Mother 74    Hypertension Father     Stroke Father 65        tia    Diabetes Brother         DMII    Heart Disease Paternal Grandmother 55        fatal - no details available          Objective:   PHYSICAL EXAM  VITAL SIGNS: /72   Pulse 80   Temp 36.9 °C (98.4 °F) (Temporal)   Resp 16   Ht 1.575 m (5' 2\")   Wt 94.3 kg (208 lb)   LMP 2010   SpO2 97%   BMI 38.04 kg/m²     Gen: no acute distress, normal voice  Psych: normal affect, normal judgement, alert, awake  Skin: dry, intact, moist mucosal membranes  Lungs: CTAB w/ symmetric expansion  CV: RRR w/o murmurs or clicks  MSK: Thoracic/lumbar spine: No focal tenderness palpation.  Limited ROM with flexion.  Distal sensation and motor fully intact.    Assessment/Plan:     1. Strain of lumbar region, initial encounter  Lidocaine 4 % Patch    methocarbamol (ROBAXIN) 500 MG Tab      Exam is reassuring.  No red flags.  - Order Rx for topical lidocaine patch   - Order Rx for Robaxin.  Side effects " discussed including drowsiness.  Instructed to use sparingly.  - Increase Motrin to 800 mg every 8 hours as needed  - Tylenol 1000 mg as needed  - Light activity as tolerated  - Return to urgent care any new/worsening symptoms or further questions or concerns.  Patient understood everything discussed.  All questions were answered.      Differential diagnosis, natural history, supportive care, and indications for immediate follow-up discussed. All questions answered. Patient agrees with the plan of care.    Follow-up as needed if symptoms worsen or fail to improve to PCP, Urgent care or Emergency Room.    Please note that this dictation was created using voice recognition software. I have made a reasonable attempt to correct obvious errors, but I expect that there are errors of grammar and possibly content that I did not discover before finalizing the note.

## 2023-05-30 ENCOUNTER — APPOINTMENT (RX ONLY)
Dept: URBAN - NONMETROPOLITAN AREA CLINIC 15 | Facility: CLINIC | Age: 63
Setting detail: DERMATOLOGY
End: 2023-05-30

## 2023-05-30 DIAGNOSIS — D22 MELANOCYTIC NEVI: ICD-10-CM

## 2023-05-30 DIAGNOSIS — D18.0 HEMANGIOMA: ICD-10-CM

## 2023-05-30 DIAGNOSIS — L81.4 OTHER MELANIN HYPERPIGMENTATION: ICD-10-CM

## 2023-05-30 DIAGNOSIS — L82.1 OTHER SEBORRHEIC KERATOSIS: ICD-10-CM

## 2023-05-30 PROBLEM — D18.01 HEMANGIOMA OF SKIN AND SUBCUTANEOUS TISSUE: Status: ACTIVE | Noted: 2023-05-30

## 2023-05-30 PROBLEM — D22.72 MELANOCYTIC NEVI OF LEFT LOWER LIMB, INCLUDING HIP: Status: ACTIVE | Noted: 2023-05-30

## 2023-05-30 PROBLEM — D22.5 MELANOCYTIC NEVI OF TRUNK: Status: ACTIVE | Noted: 2023-05-30

## 2023-05-30 PROBLEM — D22.71 MELANOCYTIC NEVI OF RIGHT LOWER LIMB, INCLUDING HIP: Status: ACTIVE | Noted: 2023-05-30

## 2023-05-30 PROBLEM — D22.62 MELANOCYTIC NEVI OF LEFT UPPER LIMB, INCLUDING SHOULDER: Status: ACTIVE | Noted: 2023-05-30

## 2023-05-30 PROBLEM — D22.61 MELANOCYTIC NEVI OF RIGHT UPPER LIMB, INCLUDING SHOULDER: Status: ACTIVE | Noted: 2023-05-30

## 2023-05-30 PROCEDURE — ? COUNSELING

## 2023-05-30 PROCEDURE — ? LIQUID NITROGEN

## 2023-05-30 PROCEDURE — 99213 OFFICE O/P EST LOW 20 MIN: CPT

## 2023-05-30 ASSESSMENT — LOCATION DETAILED DESCRIPTION DERM
LOCATION DETAILED: LEFT DISTAL POSTERIOR UPPER ARM
LOCATION DETAILED: LEFT INFERIOR FOREHEAD
LOCATION DETAILED: RIGHT DISTAL POSTERIOR THIGH
LOCATION DETAILED: LEFT LATERAL ELBOW
LOCATION DETAILED: RIGHT PROXIMAL POSTERIOR UPPER ARM
LOCATION DETAILED: LEFT POPLITEAL SKIN
LOCATION DETAILED: RIGHT PROXIMAL DORSAL FOREARM
LOCATION DETAILED: RIGHT LATERAL SUPERIOR CHEST
LOCATION DETAILED: RIGHT VENTRAL PROXIMAL FOREARM
LOCATION DETAILED: MIDDLE STERNUM
LOCATION DETAILED: RIGHT SUPERIOR UPPER BACK
LOCATION DETAILED: INFERIOR THORACIC SPINE
LOCATION DETAILED: RIGHT PROXIMAL PRETIBIAL REGION
LOCATION DETAILED: SUBXIPHOID
LOCATION DETAILED: LOWER STERNUM
LOCATION DETAILED: LEFT VENTRAL LATERAL PROXIMAL FOREARM
LOCATION DETAILED: LEFT DISTAL POSTERIOR THIGH
LOCATION DETAILED: RIGHT ANTERIOR DISTAL THIGH
LOCATION DETAILED: RIGHT CLAVICULAR SKIN
LOCATION DETAILED: LEFT PROXIMAL PRETIBIAL REGION
LOCATION DETAILED: LEFT DISTAL DORSAL FOREARM
LOCATION DETAILED: LEFT MEDIAL SUPERIOR CHEST
LOCATION DETAILED: RIGHT POPLITEAL SKIN
LOCATION DETAILED: RIGHT CENTRAL EYEBROW
LOCATION DETAILED: RIGHT DISTAL POSTERIOR UPPER ARM
LOCATION DETAILED: RIGHT MEDIAL SUPERIOR CHEST

## 2023-05-30 ASSESSMENT — LOCATION ZONE DERM
LOCATION ZONE: LEG
LOCATION ZONE: FACE
LOCATION ZONE: ARM
LOCATION ZONE: TRUNK

## 2023-05-30 ASSESSMENT — LOCATION SIMPLE DESCRIPTION DERM
LOCATION SIMPLE: RIGHT POSTERIOR THIGH
LOCATION SIMPLE: ABDOMEN
LOCATION SIMPLE: LEFT POPLITEAL SKIN
LOCATION SIMPLE: RIGHT POSTERIOR UPPER ARM
LOCATION SIMPLE: LEFT ELBOW
LOCATION SIMPLE: LEFT POSTERIOR UPPER ARM
LOCATION SIMPLE: LEFT FOREARM
LOCATION SIMPLE: RIGHT POPLITEAL SKIN
LOCATION SIMPLE: RIGHT PRETIBIAL REGION
LOCATION SIMPLE: LEFT FOREHEAD
LOCATION SIMPLE: LEFT PRETIBIAL REGION
LOCATION SIMPLE: UPPER BACK
LOCATION SIMPLE: RIGHT FOREARM
LOCATION SIMPLE: RIGHT EYEBROW
LOCATION SIMPLE: RIGHT CLAVICULAR SKIN
LOCATION SIMPLE: RIGHT THIGH
LOCATION SIMPLE: LEFT POSTERIOR THIGH
LOCATION SIMPLE: CHEST
LOCATION SIMPLE: RIGHT UPPER BACK

## 2023-05-30 NOTE — PROCEDURE: LIQUID NITROGEN
Medical Necessity Information: It is in your best interest to select a reason for this procedure from the list below. All of these items fulfill various CMS LCD requirements except the new and changing color options.
Post-Care Instructions: I reviewed with the patient in detail post-care instructions. Patient is to wear sunprotection, and avoid picking at any of the treated lesions. Pt may apply Vaseline to crusted or scabbing areas.
Medical Necessity Clause: This procedure was medically necessary because the lesions that were treated were:  If lesion does not resolve, bx is needed.
Spray Paint Technique: No
Consent: The patient's consent was obtained including but not limited to risks of crusting, scabbing, blistering, scarring, darker or lighter pigmentary change, recurrence, incomplete removal and infection.
Spray Paint Text: The liquid nitrogen was applied to the skin utilizing a spray paint frosting technique.
Detail Level: Detailed
Duration Of Freeze Thaw-Cycle (Seconds): 0
Show Spray Paint Technique Variable?: Yes

## (undated) DEVICE — GLOVE, LITE (PAIR)

## (undated) DEVICE — TUBING, SPIROMETRY KIT

## (undated) DEVICE — SUTURE GENERAL

## (undated) DEVICE — LACTATED RINGERS INJ 1000 ML - (14EA/CA 60CA/PF)

## (undated) DEVICE — PACK KNEE ARTHROSCOPY SM OR - (2EA/CA)

## (undated) DEVICE — SPONGE GAUZE STER 4X4 8-PL - (2/PK 50PK/BX 12BX/CS)

## (undated) DEVICE — SENSOR SPO2 NEO LNCS ADHESIVE (20/BX) SEE USER NOTES

## (undated) DEVICE — CLOSURE SKIN STRIP 1/2 X 4 IN - (STERI STRIP) (50/BX 4BX/CA)

## (undated) DEVICE — DRAPE LOWER EXTREMETY - (6/CA)

## (undated) DEVICE — BLADE SHAVER AGGRESSIVE PLUS 4.0MM ANGLED (5EA/BX)

## (undated) DEVICE — KIT ROOM DECONTAMINATION

## (undated) DEVICE — NEPTUNE 4 PORT MANIFOLD - (20/PK)

## (undated) DEVICE — STERI STRIP COMPOUND BENZOIN - TINCTURE 0.6ML WITH APPLICATOR (40EA/BX)

## (undated) DEVICE — PROTECTOR ULNA NERVE - (36PR/CA)

## (undated) DEVICE — SODIUM CHL. IRRIGATION 0.9% 3000ML (4EA/CA 65CA/PF)

## (undated) DEVICE — SUTURE 3-0 PROLENE PS-1 (12PK/BX)

## (undated) DEVICE — GLOVE BIOGEL INDICATOR SZ 8 SURGICAL PF LTX - (50/BX 4BX/CA)

## (undated) DEVICE — GLOVE BIOGEL SZ 8 SURGICAL PF LTX - (50PR/BX 4BX/CA)

## (undated) DEVICE — DRAPE LARGE 3 QUARTER - (20/CA)

## (undated) DEVICE — PADDING CAST 6 IN STERILE - 6 X 4 YDS (24/CA)